# Patient Record
Sex: MALE | Race: BLACK OR AFRICAN AMERICAN | NOT HISPANIC OR LATINO | Employment: UNEMPLOYED | URBAN - METROPOLITAN AREA
[De-identification: names, ages, dates, MRNs, and addresses within clinical notes are randomized per-mention and may not be internally consistent; named-entity substitution may affect disease eponyms.]

---

## 2018-05-30 ENCOUNTER — HOSPITAL ENCOUNTER (EMERGENCY)
Facility: HOSPITAL | Age: 23
Discharge: HOME | End: 2018-05-30
Attending: EMERGENCY MEDICINE | Admitting: EMERGENCY MEDICINE
Payer: COMMERCIAL

## 2018-05-30 VITALS
OXYGEN SATURATION: 96 % | TEMPERATURE: 98.7 F | RESPIRATION RATE: 16 BRPM | DIASTOLIC BLOOD PRESSURE: 69 MMHG | SYSTOLIC BLOOD PRESSURE: 127 MMHG | WEIGHT: 150 LBS | HEART RATE: 63 BPM | BODY MASS INDEX: 22.22 KG/M2 | HEIGHT: 69 IN

## 2018-05-30 DIAGNOSIS — L02.91 ABSCESS: Primary | ICD-10-CM

## 2018-05-30 PROCEDURE — 0H9TXZZ DRAINAGE OF RIGHT BREAST, EXTERNAL APPROACH: ICD-10-PCS | Performed by: EMERGENCY MEDICINE

## 2018-05-30 PROCEDURE — 99282 EMERGENCY DEPT VISIT SF MDM: CPT

## 2018-05-30 PROCEDURE — 10061 I&D ABSCESS COMP/MULTIPLE: CPT | Performed by: PHYSICIAN ASSISTANT

## 2018-05-30 RX ORDER — SULFAMETHOXAZOLE AND TRIMETHOPRIM 800; 160 MG/1; MG/1
1 TABLET ORAL 2 TIMES DAILY
Qty: 14 TABLET | Refills: 0 | Status: SHIPPED | OUTPATIENT
Start: 2018-05-30 | End: 2018-06-06

## 2018-05-30 ASSESSMENT — ENCOUNTER SYMPTOMS
ANOREXIA: 0
VOMITING: 0
FATIGUE: 0
NAUSEA: 0
CHILLS: 0
FEVER: 0

## 2018-05-31 NOTE — ED ATTESTATION NOTE
The patient was evaluated and managed by the physician assistant.  I agree with the PA/NP’s history, physical, assessment, and plan of care, with the following exceptions: None         Bere Lowe MD  05/30/18 8571

## 2018-05-31 NOTE — ED PROVIDER NOTES
HPI     Chief Complaint   Patient presents with   • Abscess       21 yo AAM presents with abscess to his right axilla x 1 week. Reports pain and swelling worsening today. Denies drainage. Does report previous abscesses but they usually resolve on their own.           History provided by:  Patient   used: No    Abscess   Location:  Torso  Torso abscess location:  R axilla  Size:  4 cm  Abscess quality: fluctuance, induration, painful and redness    Abscess quality: not draining and not weeping    Red streaking: no    Duration:  7 days  Progression:  Worsening  Pain details:     Quality:  Pressure, throbbing and burning    Severity:  Moderate    Duration:  7 days    Timing:  Constant    Progression:  Worsening  Chronicity:  New  Context: not diabetes, not immunosuppression, not injected drug use and not skin injury    Relieved by:  Nothing  Worsened by:  Nothing  Ineffective treatments:  Warm water soaks  Associated symptoms: no anorexia, no fatigue, no fever, no nausea and no vomiting    Risk factors: prior abscess         Patient History     Past Medical History:   Diagnosis Date   • Abscess        History reviewed. No pertinent surgical history.    History reviewed. No pertinent family history.    Social History   Substance Use Topics   • Smoking status: Never Smoker   • Smokeless tobacco: Never Used   • Alcohol use No       Systems Reviewed from Nursing Triage:          Review of Systems     Review of Systems   Constitutional: Negative for chills, fatigue and fever.   Gastrointestinal: Negative for anorexia, nausea and vomiting.   Allergic/Immunologic: Negative for immunocompromised state.        Physical Exam     ED Triage Vitals [05/30/18 2124]   Temp Heart Rate Resp BP SpO2   37.1 °C (98.7 °F) 87 16 138/70 97 %      Temp Source Heart Rate Source Patient Position BP Location FiO2 (%) (Set)   Oral Monitor Sitting Right upper arm --                     Patient Vitals for the past 24 hrs:   BP  "Temp Temp src Pulse Resp SpO2 Height Weight   05/30/18 2308 127/69 - - 63 16 96 % - -   05/30/18 2124 138/70 37.1 °C (98.7 °F) Oral 87 16 97 % 1.753 m (5' 9\") 68 kg (150 lb)           Physical Exam   Constitutional: He is oriented to person, place, and time. He appears well-developed and well-nourished. No distress.   HENT:   Head: Normocephalic and atraumatic.   Eyes: EOM are normal.   Neck: Neck supple.   Cardiovascular: Normal rate, regular rhythm and intact distal pulses.    Pulmonary/Chest: Effort normal. No respiratory distress.   Musculoskeletal: Normal range of motion.   Neurological: He is alert and oriented to person, place, and time.   Skin: Skin is warm and dry. He is not diaphoretic.        Psychiatric: He has a normal mood and affect.   Nursing note and vitals reviewed.           Incision and Drainage  Date/Time: 5/30/2018 11:04 PM  Performed by: CECY DRAPER  Authorized by: WILMAR REINA     Consent:     Consent obtained:  Verbal    Consent given by:  Patient    Risks discussed:  Bleeding, incomplete drainage and infection    Alternatives discussed:  No treatment  Location:     Type:  Abscess    Size:  4 cm    Location: right axilla.  Pre-procedure details:     Skin preparation:  Chloraprep  Procedure type:     Complexity:  Complex  Procedure details:     Incision types:  Stab incision    Scalpel blade:  11    Wound management:  Probed and deloculated    Drainage:  Bloody and purulent    Drainage amount:  Moderate    Wound treatment:  Wound left open    Packing materials:  1/2 in gauze  Post-procedure details:     Patient tolerance of procedure:  Tolerated well, no immediate complications        ED Course & MDM     Labs Reviewed - No data to display    No orders to display           MDM  Number of Diagnoses or Management Options  Abscess:   Diagnosis management comments: Abscess drained, packing placed, will cover with Bactrim           ED Course          Clinical Impressions as of May 30 " 7149   Abscess     Disposition:  Discharge     PEYTON Oneil  05/30/18 2495

## 2018-05-31 NOTE — DISCHARGE INSTRUCTIONS
Your abscess was open and drained. Packing was placed. Remove it in 48 hours while in a warm shower. Change dressing daily.   Watch for worsening signs of infection.     Follow up with Dermatology for further evaluation.     Return to ED immediately if symptoms change or worsen.

## 2018-06-14 ENCOUNTER — HOSPITAL ENCOUNTER (EMERGENCY)
Facility: HOSPITAL | Age: 23
Discharge: HOME | End: 2018-06-15
Attending: EMERGENCY MEDICINE | Admitting: EMERGENCY MEDICINE
Payer: COMMERCIAL

## 2018-06-14 DIAGNOSIS — L02.91 ABSCESS: Primary | ICD-10-CM

## 2018-06-14 PROCEDURE — 0X943ZZ DRAINAGE OF RIGHT AXILLA, PERCUTANEOUS APPROACH: ICD-10-PCS | Performed by: EMERGENCY MEDICINE

## 2018-06-15 VITALS
SYSTOLIC BLOOD PRESSURE: 130 MMHG | DIASTOLIC BLOOD PRESSURE: 60 MMHG | TEMPERATURE: 97.6 F | RESPIRATION RATE: 16 BRPM | HEART RATE: 64 BPM | OXYGEN SATURATION: 100 %

## 2018-06-15 PROCEDURE — 63700000 HC SELF-ADMINISTRABLE DRUG: Performed by: PHYSICIAN ASSISTANT

## 2018-06-15 PROCEDURE — 10060 I&D ABSCESS SIMPLE/SINGLE: CPT | Performed by: PHYSICIAN ASSISTANT

## 2018-06-15 PROCEDURE — 99283 EMERGENCY DEPT VISIT LOW MDM: CPT | Mod: 25

## 2018-06-15 RX ORDER — SULFAMETHOXAZOLE AND TRIMETHOPRIM 800; 160 MG/1; MG/1
1 TABLET ORAL 2 TIMES DAILY
Qty: 14 TABLET | Refills: 0 | Status: SHIPPED | OUTPATIENT
Start: 2018-06-15 | End: 2018-06-22

## 2018-06-15 RX ORDER — SULFAMETHOXAZOLE AND TRIMETHOPRIM 800; 160 MG/1; MG/1
TABLET ORAL
Status: DISCONTINUED
Start: 2018-06-15 | End: 2018-06-15 | Stop reason: HOSPADM

## 2018-06-15 RX ORDER — SULFAMETHOXAZOLE AND TRIMETHOPRIM 800; 160 MG/1; MG/1
1 TABLET ORAL ONCE
Status: COMPLETED | OUTPATIENT
Start: 2018-06-15 | End: 2018-06-15

## 2018-06-15 RX ADMIN — SULFAMETHOXAZOLE AND TRIMETHOPRIM 1 TABLET: 800; 160 TABLET ORAL at 01:06

## 2018-06-15 ASSESSMENT — ENCOUNTER SYMPTOMS
FATIGUE: 0
COLOR CHANGE: 1
WEAKNESS: 0
SHORTNESS OF BREATH: 0
CHEST TIGHTNESS: 0
ACTIVITY CHANGE: 0
FEVER: 0
WOUND: 0
NECK PAIN: 0
CHILLS: 0
VOMITING: 0
NAUSEA: 0
NUMBNESS: 0
NECK STIFFNESS: 0
BACK PAIN: 0
DIFFICULTY URINATING: 0

## 2018-06-15 NOTE — ED PROVIDER NOTES
HPI     Chief Complaint   Patient presents with   • Abscess         History provided by:  Patient   used: No    Abscess   Location:  Shoulder/arm  Shoulder/arm abscess location:  R axilla  Abscess quality: induration, painful and redness    Red streaking: no    Duration:  2 days  Progression:  Worsening  Pain details:     Quality:  Aching and pressure    Severity:  Mild    Timing:  Constant    Progression:  Worsening  Chronicity:  Chronic (Pt was here 2 weeks ago for drainage of abscess, put on antibiotics but pt states he did not take them. Pt did see Dermatology since last ER visit)  Relieved by: warm compress.  Worsened by:  Nothing  Associated symptoms: no fatigue, no fever, no nausea and no vomiting    Risk factors: prior abscess         Patient History     Past Medical History:   Diagnosis Date   • Abscess    • Axillary hyperhidrosis        History reviewed. No pertinent surgical history.    History reviewed. No pertinent family history.    Social History   Substance Use Topics   • Smoking status: Never Smoker   • Smokeless tobacco: Never Used   • Alcohol use No       Systems Reviewed from Nursing Triage:          Review of Systems     Review of Systems   Constitutional: Negative for activity change, chills, fatigue and fever.   Respiratory: Negative for chest tightness and shortness of breath.    Cardiovascular: Negative for chest pain.   Gastrointestinal: Negative for nausea and vomiting.   Genitourinary: Negative for difficulty urinating.   Musculoskeletal: Negative for back pain, neck pain and neck stiffness.   Skin: Positive for color change (redness, swelling surrounding abscess). Negative for wound.   Neurological: Negative for weakness and numbness.        Physical Exam     ED Triage Vitals [06/15/18 0012]   Temp Heart Rate Resp BP SpO2   36.4 °C (97.6 °F) 64 16 130/60 100 %      Temp Source Heart Rate Source Patient Position BP Location FiO2 (%) (Set)   Oral Monitor Sitting Left  upper arm --                     Patient Vitals for the past 24 hrs:   BP Temp Temp src Pulse Resp SpO2   06/15/18 0012 130/60 36.4 °C (97.6 °F) Oral 64 16 100 %           Physical Exam   Constitutional: He is oriented to person, place, and time. Vital signs are normal. He appears well-developed and well-nourished. No distress.   HENT:   Head: Normocephalic.   Neck: Normal range of motion.   Cardiovascular: Normal rate, regular rhythm and normal heart sounds.    Pulmonary/Chest: Effort normal and breath sounds normal.   Neurological: He is alert and oriented to person, place, and time. He has normal strength. He is not disoriented. No cranial nerve deficit or sensory deficit.   Skin: Skin is warm.   +2.5 cm by 2 cm right axilla abscess, +induration, tender to palpation. No noted drainage, no streaking, no adenopathy.    Psychiatric: He has a normal mood and affect. His speech is normal and behavior is normal.            Incision and Drainage  Date/Time: 6/15/2018 1:41 AM  Performed by: DAVIS ROLON  Authorized by: AMARILYS BARRAGAN     Consent:     Consent obtained:  Verbal    Consent given by:  Patient    Risks discussed:  Bleeding, incomplete drainage, pain and infection    Alternatives discussed:  No treatment  Location:     Type:  Abscess    Size:  2.5 cm by 2 cm    Location:  Upper extremity    Upper extremity location: right axilla.  Pre-procedure details:     Skin preparation:  Chloraprep  Anesthesia (see MAR for exact dosages):     Anesthesia method:  Local infiltration    Local anesthetic:  Lidocaine 1% w/o epi  Procedure type:     Complexity:  Simple  Procedure details:     Incision types:  Single straight    Scalpel blade:  11    Wound management:  Probed and deloculated and irrigated with saline    Drainage:  Purulent    Drainage amount:  Moderate    Wound treatment:  Wound left open    Packing materials:  1/2 in gauze  Post-procedure details:     Patient tolerance of procedure:  Tolerated well, no  immediate complications        ED Course & MDM     Labs Reviewed - No data to display    No orders to display           MDM         ED Course          Clinical Impressions as of Melquiades 15 0142   Abscess     Disposition:  Discharge     PEYTON Damian  06/15/18 0142

## 2018-06-15 NOTE — ED ATTESTATION NOTE
The patient was evaluated and managed by the physician assistant.    Case discussed with PA, I supervised care and agree with plan.     Endy Davis MD  06/15/18 0213

## 2018-07-15 ENCOUNTER — HOSPITAL ENCOUNTER (EMERGENCY)
Facility: HOSPITAL | Age: 23
Discharge: HOME | End: 2018-07-15
Attending: EMERGENCY MEDICINE
Payer: COMMERCIAL

## 2018-07-15 VITALS
HEIGHT: 70 IN | OXYGEN SATURATION: 100 % | RESPIRATION RATE: 16 BRPM | TEMPERATURE: 98.5 F | DIASTOLIC BLOOD PRESSURE: 72 MMHG | WEIGHT: 150 LBS | HEART RATE: 61 BPM | BODY MASS INDEX: 21.47 KG/M2 | SYSTOLIC BLOOD PRESSURE: 128 MMHG

## 2018-07-15 DIAGNOSIS — K12.1 MOUTH ULCER: Primary | ICD-10-CM

## 2018-07-15 PROCEDURE — 99281 EMR DPT VST MAYX REQ PHY/QHP: CPT

## 2018-07-15 RX ORDER — AMOXICILLIN 500 MG/1
CAPSULE ORAL
Refills: 0 | COMMUNITY
Start: 2018-07-02 | End: 2018-09-29 | Stop reason: ALTCHOICE

## 2018-07-15 ASSESSMENT — ENCOUNTER SYMPTOMS
SHORTNESS OF BREATH: 0
ACTIVITY CHANGE: 0
ABDOMINAL PAIN: 0
LIGHT-HEADEDNESS: 0
WEAKNESS: 0
CHEST TIGHTNESS: 0
DIFFICULTY URINATING: 0
CHILLS: 0
FEVER: 0
NECK PAIN: 0
ADENOPATHY: 0
RHINORRHEA: 0
BRUISES/BLEEDS EASILY: 0
SORE THROAT: 0
NECK STIFFNESS: 0
BACK PAIN: 0
FACIAL SWELLING: 0
TROUBLE SWALLOWING: 0
DIZZINESS: 0
SWOLLEN GLANDS: 0

## 2018-07-15 NOTE — ED ATTESTATION NOTE
I saw this patient in concert with the physician assistant, I have evaluated the patient with the physician assistant and I attest to the history, and physical exam, review of systems, medical decision-making, And final disposition of this patient with the physician assistant.     Cesar Cote, DO  07/15/18 0607

## 2018-07-15 NOTE — ED PROVIDER NOTES
HPI     Chief Complaint   Patient presents with   • Mouth Lesions         History provided by:  Patient   used: No    Mouth Lesions   Location:  Lower lip  Lower lip location:  R inner  Quality:  Blistered  Onset quality:  Gradual (Pt states he developed a blister to right lower lip over the past few weeks, today the area burst and started to drian a mixture of fluid and blood. Pt is here to make sure it will not get infected. Currently on Amox for an abscess)  Severity:  Mild  Duration:  3 weeks  Progression since onset: Burst PTA.  Context: not a possible infection and not trauma    Worsened by:  Nothing  Associated symptoms: no congestion, no dental pain, no fever, no neck pain, no rash, no rhinorrhea, no sore throat and no swollen glands         Patient History     Past Medical History:   Diagnosis Date   • Abscess    • Axillary hyperhidrosis        History reviewed. No pertinent surgical history.    History reviewed. No pertinent family history.    Social History   Substance Use Topics   • Smoking status: Never Smoker   • Smokeless tobacco: Never Used   • Alcohol use No       Systems Reviewed from Nursing Triage:          Review of Systems     Review of Systems   Constitutional: Negative for activity change, chills and fever.   HENT: Positive for mouth sores. Negative for congestion, drooling, facial swelling, rhinorrhea, sore throat and trouble swallowing.    Respiratory: Negative for chest tightness and shortness of breath.    Cardiovascular: Negative for chest pain.   Gastrointestinal: Negative for abdominal pain.   Genitourinary: Negative for difficulty urinating.   Musculoskeletal: Negative for back pain, gait problem, neck pain and neck stiffness.   Skin: Negative for rash.   Neurological: Negative for dizziness, weakness and light-headedness.   Hematological: Negative for adenopathy. Does not bruise/bleed easily.        Physical Exam     ED Triage Vitals [07/15/18 0254]   Temp Heart  "Rate Resp BP SpO2   36.9 °C (98.5 °F) (!) 57 14 124/80 100 %      Temp Source Heart Rate Source Patient Position BP Location FiO2 (%) (Set)   Oral Monitor -- -- --                     Patient Vitals for the past 24 hrs:   BP Temp Temp src Pulse Resp SpO2 Height Weight   07/15/18 0254 124/80 36.9 °C (98.5 °F) Oral (!) 57 14 100 % 1.778 m (5' 10\") 68 kg (150 lb)           Physical Exam   Constitutional: He is oriented to person, place, and time. Vital signs are normal. He appears well-developed and well-nourished. No distress.   HENT:   Head: Normocephalic and atraumatic.   Mouth/Throat: Uvula is midline, oropharynx is clear and moist and mucous membranes are normal. Tonsils are 0 on the right. Tonsils are 0 on the left. No tonsillar exudate.   +abrasion/ulcer to right inner lip, no drainage noted, no erythema. Non tender to palpation   Neck: Normal range of motion. Neck supple.   Cardiovascular: Normal rate, regular rhythm and normal heart sounds.    Pulmonary/Chest: Effort normal and breath sounds normal.   Lymphadenopathy:     He has no cervical adenopathy.   Neurological: He is alert and oriented to person, place, and time. He has normal strength. He is not disoriented. No cranial nerve deficit or sensory deficit.   Skin: Skin is warm. Capillary refill takes less than 2 seconds.   Psychiatric: He has a normal mood and affect.            Procedures    ED Course & MDM     Labs Reviewed - No data to display    No orders to display           MDM         ED Course          Clinical Impressions as of Jul 15 0338   Mouth ulcer        PEYTON Damian  07/15/18 0338    "

## 2018-09-29 ENCOUNTER — HOSPITAL ENCOUNTER (EMERGENCY)
Facility: HOSPITAL | Age: 23
Discharge: HOME | End: 2018-09-29
Attending: EMERGENCY MEDICINE
Payer: COMMERCIAL

## 2018-09-29 VITALS
SYSTOLIC BLOOD PRESSURE: 142 MMHG | WEIGHT: 150 LBS | DIASTOLIC BLOOD PRESSURE: 80 MMHG | TEMPERATURE: 97.8 F | HEART RATE: 66 BPM | BODY MASS INDEX: 21.47 KG/M2 | HEIGHT: 70 IN | OXYGEN SATURATION: 100 % | RESPIRATION RATE: 14 BRPM

## 2018-09-29 DIAGNOSIS — A60.02 HERPES GENITALIS IN MEN: ICD-10-CM

## 2018-09-29 DIAGNOSIS — R21 RASH: Primary | ICD-10-CM

## 2018-09-29 LAB
BILIRUB UR QL STRIP.AUTO: NEGATIVE MG/DL
CLARITY UR REFRACT.AUTO: CLEAR
COLOR UR AUTO: YELLOW
GLUCOSE UR STRIP.AUTO-MCNC: NEGATIVE MG/DL
HGB UR QL STRIP.AUTO: NEGATIVE
HSV1 DNA SPEC QL NAA+PROBE: NOT DETECTED
HSV2 DNA SPEC QL NAA+PROBE: NOT DETECTED
KETONES UR STRIP.AUTO-MCNC: NEGATIVE MG/DL
LEUKOCYTE ESTERASE UR QL STRIP.AUTO: NEGATIVE
NITRITE UR QL STRIP.AUTO: NEGATIVE
PH UR STRIP.AUTO: 7.5 [PH]
PROT UR QL STRIP.AUTO: NEGATIVE
SP GR UR REFRACT.AUTO: 1.01
UROBILINOGEN UR STRIP-ACNC: 1 EU/DL

## 2018-09-29 PROCEDURE — 87150 DNA/RNA AMPLIFIED PROBE: CPT | Performed by: PHYSICIAN ASSISTANT

## 2018-09-29 PROCEDURE — 99283 EMERGENCY DEPT VISIT LOW MDM: CPT

## 2018-09-29 PROCEDURE — 81003 URINALYSIS AUTO W/O SCOPE: CPT | Performed by: PHYSICIAN ASSISTANT

## 2018-09-29 PROCEDURE — 87491 CHLMYD TRACH DNA AMP PROBE: CPT | Mod: 59 | Performed by: PHYSICIAN ASSISTANT

## 2018-09-29 RX ORDER — CLOTRIMAZOLE 1 G/ML
SOLUTION TOPICAL 2 TIMES DAILY
COMMUNITY
End: 2020-01-10

## 2018-09-29 RX ORDER — VALACYCLOVIR HYDROCHLORIDE 1 G/1
1000 TABLET, FILM COATED ORAL 2 TIMES DAILY
Qty: 14 TABLET | Refills: 0 | Status: SHIPPED | OUTPATIENT
Start: 2018-09-29 | End: 2018-10-06

## 2018-10-01 LAB
C TRACH DNA SPEC QL NAA+PROBE: NOT DETECTED
N GONORRHOEA DNA SPEC QL NAA+PROBE: NOT DETECTED

## 2018-10-01 ASSESSMENT — ENCOUNTER SYMPTOMS
ABDOMINAL PAIN: 0
HEMATURIA: 0
FEVER: 0
CHILLS: 0
ACTIVITY CHANGE: 0
DIFFICULTY URINATING: 0

## 2018-10-01 NOTE — ED PROVIDER NOTES
HPI     Chief Complaint   Patient presents with   • Rash     Patient with rash to groin for 2 weeks.  States was seen in clinic 4 aj ago and given fungal cream for jock itch.  States since starting cream he states that the rash is worse         History provided by:  Patient   used: No    Rash   Location:  Pelvis  Pelvic rash location:  Penis  Quality: blistering, itchiness and redness    Severity:  Mild  Onset quality:  Gradual  Duration:  2 weeks  Timing:  Constant  Chronicity:  New  Context comment:  Pt started with a rash to the under side of his penis for the past 2 weeks, saw clinic 4 days ago and was started on fungal cream but patient states that is causing the rash to become itchy and worsening rash.   Relieved by:  Nothing  Exacerbated by: fungal cream.  Associated symptoms: no abdominal pain and no fever    Associated symptoms comment:  Pt is sexually active and received oral sex from his partner, rash started after       Patient History     Past Medical History:   Diagnosis Date   • Abscess    • Axillary hyperhidrosis        History reviewed. No pertinent surgical history.    History reviewed. No pertinent family history.    Social History   Substance Use Topics   • Smoking status: Never Smoker   • Smokeless tobacco: Never Used   • Alcohol use Yes       Systems Reviewed from Nursing Triage:          Review of Systems     Review of Systems   Constitutional: Negative for activity change, chills and fever.   Gastrointestinal: Negative for abdominal pain.   Genitourinary: Negative for difficulty urinating, discharge, hematuria, penile pain, penile swelling, scrotal swelling and testicular pain.   Skin: Positive for rash.        Physical Exam     ED Triage Vitals   Temp Heart Rate Resp BP SpO2   09/29/18 0922 09/29/18 0922 09/29/18 0922 09/29/18 0922 09/29/18 0922   36.8 °C (98.3 °F) 61 14 133/68 98 %      Temp Source Heart Rate Source Patient Position BP Location FiO2 (%) (Set)   09/29/18  0922 09/29/18 0922 09/29/18 1118 09/29/18 1118 --   Oral Monitor Sitting Right upper arm                      No data found.          Physical Exam   Constitutional: He is oriented to person, place, and time. Vital signs are normal. He appears well-developed and well-nourished. No distress.   HENT:   Head: Normocephalic.   Neck: Normal range of motion.   Cardiovascular: Normal rate and regular rhythm.    Pulmonary/Chest: Effort normal and breath sounds normal.   Abdominal: Soft. Normal appearance and bowel sounds are normal. There is no tenderness.   Genitourinary: Testes normal.   Genitourinary Comments: +vesicular rash to penile shaft  No erythema, no rash to groin or skin folds   Lymphadenopathy: No inguinal adenopathy noted on the right or left side.   Neurological: He is alert and oriented to person, place, and time. He is not disoriented.   Skin: Skin is warm. Capillary refill takes less than 2 seconds. Rash noted. Rash is vesicular.   Psychiatric: He has a normal mood and affect.            Procedures    ED Course & MDM     Labs Reviewed   HSV 1/2 BY DNA AMPLIFICATION - Normal       Result Value    HSV 1 DNA Not Detected      HSV 2 DNA Not Detected     UA REFLEX CULTURE (MACROSCOPIC) - Normal    Color, Urine Yellow      Clarity, Urine Clear      Specific Gravity, Urine 1.010      pH, Urine 7.5      Leukocyte Esterase Negative      Nitrite, Urine Negative      Protein, Urine Negative      Glucose, Urine Negative      Ketones, Urine Negative      Urobilinogen, Urine 1.0      Bilirubin, Urine Negative      Blood, Urine Negative     CHLAMYDIA/GC DNA,PCR:URINE,SWAB   URINALYSIS REFLEX CULTURE    Narrative:     The following orders were created for panel order Urinalysis with Reflex Culture.  Procedure                               Abnormality         Status                     ---------                               -----------         ------                     UA Reflex to Culture (Mac...[[id number]]  Normal               Final result                 Please view results for these tests on the individual orders.       No orders to display           MDM         Clinical Impressions as of Oct 01 1348   Rash   Herpes genitalis in men        Wilma Perez PA C  10/01/18 9769

## 2018-10-03 NOTE — ED ATTESTATION NOTE
The patient was evaluated and managed by the physician assistant / nurse practitioner.       Fito Jain MD  10/03/18 0136

## 2019-01-17 ENCOUNTER — HOSPITAL ENCOUNTER (EMERGENCY)
Facility: HOSPITAL | Age: 24
Discharge: HOME | End: 2019-01-17
Attending: EMERGENCY MEDICINE
Payer: COMMERCIAL

## 2019-01-17 ENCOUNTER — APPOINTMENT (EMERGENCY)
Dept: RADIOLOGY | Facility: HOSPITAL | Age: 24
End: 2019-01-17
Attending: EMERGENCY MEDICINE
Payer: COMMERCIAL

## 2019-01-17 VITALS
HEART RATE: 72 BPM | RESPIRATION RATE: 17 BRPM | DIASTOLIC BLOOD PRESSURE: 64 MMHG | TEMPERATURE: 98.1 F | HEIGHT: 70 IN | BODY MASS INDEX: 21.47 KG/M2 | SYSTOLIC BLOOD PRESSURE: 115 MMHG | WEIGHT: 150 LBS | OXYGEN SATURATION: 98 %

## 2019-01-17 DIAGNOSIS — M94.0 COSTOCHONDRITIS: Primary | ICD-10-CM

## 2019-01-17 PROCEDURE — 63700000 HC SELF-ADMINISTRABLE DRUG: Performed by: EMERGENCY MEDICINE

## 2019-01-17 PROCEDURE — 99283 EMERGENCY DEPT VISIT LOW MDM: CPT | Mod: 25

## 2019-01-17 PROCEDURE — 93005 ELECTROCARDIOGRAM TRACING: CPT | Performed by: EMERGENCY MEDICINE

## 2019-01-17 PROCEDURE — 71046 X-RAY EXAM CHEST 2 VIEWS: CPT

## 2019-01-17 RX ORDER — IBUPROFEN 600 MG/1
600 TABLET ORAL ONCE
Status: COMPLETED | OUTPATIENT
Start: 2019-01-17 | End: 2019-01-17

## 2019-01-17 RX ORDER — ONABOTULINUMTOXINA 100 [USP'U]/1
INJECTION, POWDER, LYOPHILIZED, FOR SOLUTION INTRADERMAL; INTRAMUSCULAR
COMMUNITY
Start: 2018-11-29 | End: 2020-01-10

## 2019-01-17 RX ORDER — IBUPROFEN 600 MG/1
600 TABLET ORAL EVERY 6 HOURS PRN
Qty: 30 TABLET | Refills: 0 | Status: SHIPPED | OUTPATIENT
Start: 2019-01-17 | End: 2020-01-10

## 2019-01-17 RX ADMIN — IBUPROFEN 600 MG: 600 TABLET ORAL at 11:29

## 2019-01-17 ASSESSMENT — ENCOUNTER SYMPTOMS
FATIGUE: 0
PALPITATIONS: 0
SORE THROAT: 0
VOMITING: 0
BACK PAIN: 0
FLANK PAIN: 0
COUGH: 0
DYSURIA: 0
ARTHRALGIAS: 0
ABDOMINAL PAIN: 0
COLOR CHANGE: 0
TROUBLE SWALLOWING: 0
SHORTNESS OF BREATH: 0
HEADACHES: 0
NAUSEA: 0
FEVER: 0
LOWER EXTREMITY EDEMA: 0
NERVOUS/ANXIOUS: 1

## 2019-01-17 NOTE — DISCHARGE INSTRUCTIONS
Please review all discharge instructions as discussed.  Please be re-evaluated by your primary doctor as soon as possible and be sure to review all laboratory, radiology and other testing with your doctor.  Should your symptoms worsen or not improve, return to emergency room immediately.

## 2019-01-17 NOTE — ED PROVIDER NOTES
HPI     Chief Complaint   Patient presents with   • Chest Pain     Patient c/o generalized chest pain that comes and goes for 1.5 weeks.  States it is stabbing pain and when he lays down he feels sob.  Denies being sick.  No airplane or long car rides   • Shortness of Breath         History provided by:  Patient   used: No    Chest Pain   Pain location:  L chest (sternal)  Pain quality: sharp    Pain radiates to:  Does not radiate  Pain severity:  Moderate  Onset quality:  Unable to specify (1.5 weeks ago)  Duration:  1 day  Timing:  Intermittent  Chronicity:  New  Context: not trauma    Context comment:  +increased stress levels.  Worsened by:  Certain positions (lying flat)  Associated symptoms: anxiety    Associated symptoms: no abdominal pain, no back pain, no cough, no dysphagia, no fatigue, no fever, no headache, no lower extremity edema, no nausea, no palpitations, no shortness of breath and no vomiting         Patient History     Past Medical History:   Diagnosis Date   • Abscess    • Axillary hyperhidrosis        History reviewed. No pertinent surgical history.    History reviewed. No pertinent family history.    Social History   Substance Use Topics   • Smoking status: Never Smoker   • Smokeless tobacco: Never Used   • Alcohol use Yes      Comment: socially       Systems Reviewed from Nursing Triage:          Review of Systems     Review of Systems   Constitutional: Negative for fatigue and fever.   HENT: Negative for sore throat and trouble swallowing.    Respiratory: Negative for cough and shortness of breath.    Cardiovascular: Positive for chest pain. Negative for palpitations.   Gastrointestinal: Negative for abdominal pain, nausea and vomiting.   Genitourinary: Negative for dysuria and flank pain.   Musculoskeletal: Negative for arthralgias and back pain.   Skin: Negative for color change and rash.   Neurological: Negative for syncope and headaches.   Psychiatric/Behavioral: The  "patient is nervous/anxious.    All other systems reviewed and are negative.       Physical Exam     ED Triage Vitals [01/17/19 1054]   Temp Heart Rate Resp BP SpO2   36.6 °C (97.8 °F) 71 18 130/64 97 %      Temp Source Heart Rate Source Patient Position BP Location FiO2 (%) (Set)   Oral Monitor Sitting Right upper arm --       Pulse Ox %: 98 % (01/17/19 1106)  Pulse Ox Interpretation: Normal (01/17/19 1106)  Heart Rate: 66 (01/17/19 1106)       Patient Vitals for the past 24 hrs:   BP Temp Temp src Pulse Resp SpO2 Height Weight   01/17/19 1105 123/66 - - 76 18 97 % - -   01/17/19 1054 130/64 36.6 °C (97.8 °F) Oral 71 18 97 % 1.778 m (5' 10\") 68 kg (150 lb)           Physical Exam   Constitutional: He appears well-developed and well-nourished.   HENT:   Head: Normocephalic and atraumatic.   Eyes: Conjunctivae are normal. No scleral icterus.   Neck: Neck supple. No JVD present.   Cardiovascular: Normal rate and regular rhythm.    Pulmonary/Chest: Effort normal and breath sounds normal. No respiratory distress. He exhibits tenderness.   L parasternal tenderness.   Abdominal: Soft. There is no tenderness.   Musculoskeletal: He exhibits no edema or tenderness.   Neurological: He is alert. He has normal strength.   Skin: Skin is warm and dry.   Psychiatric: He has a normal mood and affect. His behavior is normal.   Nursing note and vitals reviewed.           Procedures    ED Course & MDM     Labs Reviewed - No data to display    X-RAY CHEST 2 VIEWS   Final Result   IMPRESSION:  No active disease      ECG 12 lead    (Results Pending)               MDM         ED Course as of Jan 17 1200   Thu Jan 17, 2019   1147 23-year-old male no significant PMH complains of constant midsternal CP for 1 week.  Denies injury.  Not coughing. No leg swelling/VTE history. No fever or recent URI.  Reproducible chest wall tenderness  EKG-no acute changes  PERC neg  HEART score 0  CXR clear  Story c/w MSK pain. Will Rx Ibuprofen.  [NADIA]      ED " Course User Index  [NADIA] Jae Agudelo MD         Clinical Impressions as of Jan 17 1200   Costochondritis        Scribe Attestation  By signing my name below, I, Cali Hartley, attest that this documentation has been prepared under the direction and in the presence of Dr. Agudelo.    1/17/2019 11:17 AM    I, Jae Agudelo, personally performed the services described in this documentation, as documented by the scribe in my presence, and it is both accurate and complete.  1/17/2019 12:00 PM              Cali Hartley  01/17/19 1121       Jae Agudelo MD  01/17/19 1200

## 2019-01-18 LAB
ATRIAL RATE: 72
P AXIS: 74
PR INTERVAL: 148
QRS DURATION: 100
QT INTERVAL: 374
QTC CALCULATION(BAZETT): 409
R AXIS: 91
T WAVE AXIS: 70
VENTRICULAR RATE: 72

## 2019-01-18 PROCEDURE — 93010 ELECTROCARDIOGRAM REPORT: CPT | Performed by: INTERNAL MEDICINE

## 2019-02-28 ENCOUNTER — HOSPITAL ENCOUNTER (EMERGENCY)
Facility: HOSPITAL | Age: 24
Discharge: HOME | End: 2019-02-28
Attending: EMERGENCY MEDICINE
Payer: COMMERCIAL

## 2019-02-28 VITALS
TEMPERATURE: 98 F | WEIGHT: 150 LBS | RESPIRATION RATE: 16 BRPM | HEART RATE: 70 BPM | SYSTOLIC BLOOD PRESSURE: 132 MMHG | BODY MASS INDEX: 22.22 KG/M2 | DIASTOLIC BLOOD PRESSURE: 70 MMHG | HEIGHT: 69 IN | OXYGEN SATURATION: 99 %

## 2019-02-28 DIAGNOSIS — L02.91 ABSCESS: Primary | ICD-10-CM

## 2019-02-28 PROCEDURE — 10160 PNXR ASPIR ABSC HMTMA BULLA: CPT | Performed by: PHYSICIAN ASSISTANT

## 2019-02-28 PROCEDURE — 0H9BXZZ DRAINAGE OF RIGHT UPPER ARM SKIN, EXTERNAL APPROACH: ICD-10-PCS | Performed by: EMERGENCY MEDICINE

## 2019-02-28 PROCEDURE — 99282 EMERGENCY DEPT VISIT SF MDM: CPT | Mod: 25

## 2019-02-28 RX ORDER — SULFAMETHOXAZOLE AND TRIMETHOPRIM 800; 160 MG/1; MG/1
1 TABLET ORAL 2 TIMES DAILY
Qty: 14 TABLET | Refills: 0 | Status: SHIPPED | OUTPATIENT
Start: 2019-02-28 | End: 2019-02-28

## 2019-02-28 RX ORDER — SULFAMETHOXAZOLE AND TRIMETHOPRIM 800; 160 MG/1; MG/1
1 TABLET ORAL 2 TIMES DAILY
Qty: 14 TABLET | Refills: 0 | Status: SHIPPED | OUTPATIENT
Start: 2019-02-28 | End: 2019-03-07

## 2019-02-28 ASSESSMENT — ENCOUNTER SYMPTOMS
MUSCULOSKELETAL NEGATIVE: 1
CHILLS: 0
FEVER: 0
NUMBNESS: 0

## 2019-02-28 NOTE — DISCHARGE INSTRUCTIONS
Follow-up with PCP or return in 2 days for packing removal.  Please return sooner for new or worsening concerns

## 2019-02-28 NOTE — ED PROVIDER NOTES
"HPI     Chief Complaint   Patient presents with   • Abscess     Patient with abscess to right underarm for 1 week       24 y/o AAM who presents to er for evaluation of cyst to R arm x 1 week. Pt denies fevers, chills, warmth. He reports prior abscess to same location summer 2018. Pt denies drainage from skin             Patient History     Past Medical History:   Diagnosis Date   • Abscess    • Axillary hyperhidrosis        History reviewed. No pertinent surgical history.    History reviewed. No pertinent family history.    Social History   Substance Use Topics   • Smoking status: Never Smoker   • Smokeless tobacco: Never Used   • Alcohol use Yes      Comment: socially       Systems Reviewed from Nursing Triage:          Review of Systems     Review of Systems   Constitutional: Negative for chills and fever.   Musculoskeletal: Negative.    Skin:        Axilla abscess   Neurological: Negative for numbness.        Physical Exam     ED Triage Vitals [02/28/19 0849]   Temp Heart Rate Resp BP SpO2   36.8 °C (98.3 °F) 66 18 (!) 141/61 96 %      Temp Source Heart Rate Source Patient Position BP Location FiO2 (%) (Set)   Oral Monitor Sitting Right upper arm --                     Patient Vitals for the past 24 hrs:   BP Temp Temp src Pulse Resp SpO2 Height Weight   02/28/19 1123 132/70 36.7 °C (98 °F) Oral 70 16 99 % - -   02/28/19 1015 (!) 141/72 37 °C (98.6 °F) Oral 76 18 - - -   02/28/19 0849 (!) 141/61 36.8 °C (98.3 °F) Oral 66 18 96 % 1.753 m (5' 9\") 68 kg (150 lb)           Physical Exam   Constitutional: He is oriented to person, place, and time. He appears well-developed and well-nourished.   Pulmonary/Chest: No respiratory distress.   Musculoskeletal: Normal range of motion.   Neurological: He is alert and oriented to person, place, and time.   Skin:   4 cm area of swelling, redness, tenderness to R axilla   Nursing note and vitals reviewed.           Incision and Drainage  Date/Time: 2/28/2019 10:55 AM  Performed " by: HELENE FORTE  Authorized by: DINESH STOREY     Consent:     Consent obtained:  Verbal    Consent given by:  Patient  Location:     Type:  Abscess    Size:  4    Location:  Upper extremity    Upper extremity location:  Arm    Arm location:  R upper arm  Pre-procedure details:     Skin preparation:  Betadine  Anesthesia (see MAR for exact dosages):     Anesthesia method:  Local infiltration    Local anesthetic:  Lidocaine 1% WITH epi  Procedure type:     Complexity:  Simple  Procedure details:     Incision types:  Single straight    Scalpel blade:  11    Wound management:  Probed and deloculated and irrigated with saline    Drainage:  Purulent    Drainage amount:  Moderate    Packing materials:  1/4 in gauze  Post-procedure details:     Patient tolerance of procedure:  Tolerated well, no immediate complications        ED Course & MDM     Labs Reviewed - No data to display    No orders to display               MDM  Number of Diagnoses or Management Options  Diagnosis management comments: Bedside I/D  Staffed with Dr. Storey           Clinical Impressions as of Feb 28 1140   Abscess        Helene Forte PA C  02/28/19 1141

## 2019-03-15 ENCOUNTER — HOSPITAL ENCOUNTER (EMERGENCY)
Facility: HOSPITAL | Age: 24
Discharge: HOME | End: 2019-03-15
Attending: EMERGENCY MEDICINE
Payer: COMMERCIAL

## 2019-03-15 ENCOUNTER — APPOINTMENT (EMERGENCY)
Dept: RADIOLOGY | Facility: HOSPITAL | Age: 24
End: 2019-03-15
Payer: COMMERCIAL

## 2019-03-15 VITALS
RESPIRATION RATE: 15 BRPM | WEIGHT: 150 LBS | DIASTOLIC BLOOD PRESSURE: 61 MMHG | HEIGHT: 69 IN | HEART RATE: 74 BPM | BODY MASS INDEX: 22.22 KG/M2 | SYSTOLIC BLOOD PRESSURE: 135 MMHG | OXYGEN SATURATION: 99 % | TEMPERATURE: 98.1 F

## 2019-03-15 DIAGNOSIS — S60.512A ABRASION OF LEFT HAND, INITIAL ENCOUNTER: ICD-10-CM

## 2019-03-15 DIAGNOSIS — S62.617A CLOSED DISPLACED FRACTURE OF PROXIMAL PHALANX OF LEFT LITTLE FINGER, INITIAL ENCOUNTER: Primary | ICD-10-CM

## 2019-03-15 PROCEDURE — 90715 TDAP VACCINE 7 YRS/> IM: CPT | Performed by: PHYSICIAN ASSISTANT

## 2019-03-15 PROCEDURE — 2W3KX1Z IMMOBILIZATION OF LEFT FINGER USING SPLINT: ICD-10-PCS | Performed by: EMERGENCY MEDICINE

## 2019-03-15 PROCEDURE — 90471 IMMUNIZATION ADMIN: CPT | Performed by: PHYSICIAN ASSISTANT

## 2019-03-15 PROCEDURE — 99283 EMERGENCY DEPT VISIT LOW MDM: CPT | Mod: 25

## 2019-03-15 PROCEDURE — 63600000 HC DRUGS/DETAIL CODE: Performed by: PHYSICIAN ASSISTANT

## 2019-03-15 PROCEDURE — 3E0234Z INTRODUCTION OF SERUM, TOXOID AND VACCINE INTO MUSCLE, PERCUTANEOUS APPROACH: ICD-10-PCS | Performed by: EMERGENCY MEDICINE

## 2019-03-15 PROCEDURE — 29130 APPL FINGER SPLINT STATIC: CPT

## 2019-03-15 PROCEDURE — 73140 X-RAY EXAM OF FINGER(S): CPT | Mod: LT

## 2019-03-15 RX ORDER — IBUPROFEN 600 MG/1
600 TABLET ORAL EVERY 6 HOURS PRN
Qty: 25 TABLET | Refills: 0 | Status: SHIPPED | OUTPATIENT
Start: 2019-03-15 | End: 2020-01-10

## 2019-03-15 RX ADMIN — TETANUS TOXOID, REDUCED DIPHTHERIA TOXOID AND ACELLULAR PERTUSSIS VACCINE, ADSORBED 0.5 ML: 5; 2.5; 8; 8; 2.5 SUSPENSION INTRAMUSCULAR at 09:55

## 2019-03-15 ASSESSMENT — ENCOUNTER SYMPTOMS
NUMBNESS: 0
WEAKNESS: 0
SORE THROAT: 0
COUGH: 0
DIARRHEA: 0
VOMITING: 0
FEVER: 0
SHORTNESS OF BREATH: 0
ABDOMINAL PAIN: 0
COLOR CHANGE: 0

## 2019-03-15 NOTE — ED ATTESTATION NOTE
The patient was evaluated and managed by the physician assistant / nurse practitioner.       Luis Aguirre MD  03/15/19 1126
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
## 2019-09-26 ENCOUNTER — HOSPITAL ENCOUNTER (EMERGENCY)
Facility: HOSPITAL | Age: 24
Discharge: HOME | End: 2019-09-26
Attending: EMERGENCY MEDICINE
Payer: COMMERCIAL

## 2019-09-26 VITALS
BODY MASS INDEX: 22.4 KG/M2 | TEMPERATURE: 98.9 F | DIASTOLIC BLOOD PRESSURE: 58 MMHG | RESPIRATION RATE: 20 BRPM | WEIGHT: 160 LBS | HEART RATE: 70 BPM | SYSTOLIC BLOOD PRESSURE: 109 MMHG | OXYGEN SATURATION: 98 % | HEIGHT: 71 IN

## 2019-09-26 DIAGNOSIS — L02.91 ABSCESS: Primary | ICD-10-CM

## 2019-09-26 PROCEDURE — 25000000 HC PHARMACY GENERAL: Performed by: PHYSICIAN ASSISTANT

## 2019-09-26 PROCEDURE — 0X943ZZ DRAINAGE OF RIGHT AXILLA, PERCUTANEOUS APPROACH: ICD-10-PCS | Performed by: EMERGENCY MEDICINE

## 2019-09-26 PROCEDURE — 10160 PNXR ASPIR ABSC HMTMA BULLA: CPT | Performed by: PHYSICIAN ASSISTANT

## 2019-09-26 PROCEDURE — 99282 EMERGENCY DEPT VISIT SF MDM: CPT | Mod: 25

## 2019-09-26 PROCEDURE — 87186 SC STD MICRODIL/AGAR DIL: CPT | Performed by: PHYSICIAN ASSISTANT

## 2019-09-26 RX ORDER — SULFAMETHOXAZOLE AND TRIMETHOPRIM 800; 160 MG/1; MG/1
1 TABLET ORAL 2 TIMES DAILY
Qty: 20 TABLET | Refills: 0 | Status: SHIPPED | OUTPATIENT
Start: 2019-09-26 | End: 2019-10-06

## 2019-09-26 RX ORDER — LIDOCAINE HYDROCHLORIDE 10 MG/ML
10 INJECTION, SOLUTION EPIDURAL; INFILTRATION; INTRACAUDAL; PERINEURAL ONCE
Status: COMPLETED | OUTPATIENT
Start: 2019-09-26 | End: 2019-09-26

## 2019-09-26 RX ADMIN — Medication 10 ML: at 11:15

## 2019-09-26 ASSESSMENT — ENCOUNTER SYMPTOMS
VOMITING: 0
FEVER: 0
NAUSEA: 0
WEAKNESS: 0
WOUND: 1
NUMBNESS: 0
CHILLS: 0

## 2019-09-26 NOTE — DISCHARGE INSTRUCTIONS
Start warm, moist compresses.  Take ibuprofen for pain control.  Mandatory follow-up with your PCP within 2 to 3 days.  Call their office today or tomorrow to schedule follow-up appointment.    If you are given antibiotics, you may not experience improvement until you have taken the medication for 2 or 3 days.  Finish the entire antibiotic course unless directed otherwise by your medical provider.    You may review the results on patient portal.  You may still have tests pending; if so, these results should be available on patient portal within 3 to 5 days.    If you develop new or worsening symptoms, or if you have any questions or concerns, reports the nearest emergency department.

## 2019-09-26 NOTE — ED ATTESTATION NOTE
The patient was evaluated and managed by the physician assistant.  I agree with the PA/NP’s history, physical, assessment, and plan of care, with the following exceptions: None         Bere Lowe MD  09/26/19 1944

## 2019-09-26 NOTE — ED PROVIDER NOTES
"HPI     Chief Complaint   Patient presents with   • Abscess     Patient states abscess under right armpit.       Patient with no self-reported past medical history presents with complaint of abscess in the right axilla that began about 4 days ago.  He states that it has increased significantly in size since onset.  He denies any associated symptoms, including fever, chills, nausea, vomiting.  There is no discharge from the site.  He states that he has had these in the past before and they have required incision and drainage.             Patient History     Past Medical History:   Diagnosis Date   • Abscess    • Axillary hyperhidrosis        History reviewed. No pertinent surgical history.    History reviewed. No pertinent family history.    Social History   Substance Use Topics   • Smoking status: Never Smoker   • Smokeless tobacco: Never Used   • Alcohol use Yes      Comment: socially       Systems Reviewed from Nursing Triage:          Review of Systems     Review of Systems   Constitutional: Negative for chills and fever.   Gastrointestinal: Negative for nausea and vomiting.   Skin: Positive for wound. Negative for pallor.   Neurological: Negative for weakness and numbness.        Physical Exam     ED Triage Vitals   Temp Heart Rate Resp BP SpO2   09/26/19 0838 09/26/19 0835 09/26/19 0835 09/26/19 0838 09/26/19 0835   36.9 °C (98.4 °F) 66 18 130/63 95 %      Temp Source Heart Rate Source Patient Position BP Location FiO2 (%) (Set)   09/26/19 0838 09/26/19 0835 09/26/19 0838 09/26/19 0838 --   Oral Monitor Lying Right upper arm                      Patient Vitals for the past 24 hrs:   BP Temp Temp src Pulse Resp SpO2 Height Weight   09/26/19 1149 (!) 109/58 - - - 20 98 % - -   09/26/19 1003 117/73 37.2 °C (98.9 °F) - 70 18 98 % - -   09/26/19 0838 130/63 36.9 °C (98.4 °F) Oral 63 18 - - -   09/26/19 0835 - - - 66 18 95 % 1.803 m (5' 11\") 72.6 kg (160 lb)           Physical Exam   Constitutional: He appears " well-developed. No distress.   HENT:   Head: Normocephalic and atraumatic.   Eyes: Conjunctivae are normal.   Cardiovascular: Normal rate and regular rhythm.    Pulmonary/Chest: Effort normal and breath sounds normal.   Abdominal: Soft. Bowel sounds are normal. He exhibits no distension. There is no tenderness. There is no rebound and no guarding.   Musculoskeletal:   There is a 4 x 2 cm area of fluctuance/induration to the right axilla.  There is no overlying erythema.  No crepitus.  Patient has full range of motion of the right upper extremity.   Neurological: He is alert. No sensory deficit.   Skin: Skin is warm and dry.   Psychiatric: He has a normal mood and affect. His behavior is normal.   Nursing note and vitals reviewed.           Incision and Drainage  Date/Time: 9/26/2019 11:51 AM  Performed by: VICENTE MEJIA  Authorized by: WILMAR REINA     Consent:     Consent obtained:  Verbal    Consent given by:  Patient    Risks discussed:  Bleeding, incomplete drainage and infection  Location:     Type:  Abscess    Size:  4x2    Location: right axilla.  Pre-procedure details:     Skin preparation:  Betadine  Anesthesia (see MAR for exact dosages):     Anesthesia method:  Local infiltration    Local anesthetic:  Lidocaine 1% w/o epi  Procedure type:     Complexity:  Simple  Procedure details:     Incision types:  Single straight    Scalpel blade:  11    Wound management:  Probed and deloculated and extensive cleaning    Drainage:  Purulent    Drainage amount:  Moderate    Packing materials:  1/2 in gauze  Post-procedure details:     Patient tolerance of procedure:  Tolerated well, no immediate complications        ED Course & MDM     Labs Reviewed   AEROBIC BACTERIAL CULTURE / SMEAR       No orders to display               MDM  Number of Diagnoses or Management Options  Abscess:   Diagnosis management comments: Patient given instructions to remove the packing in 2 days.  He should also be evaluated at this  time either here, by his PCP, or by the surgeon.           Clinical Impressions as of Sep 26 1152   Abscess        Loretta Ga PA C  09/26/19 1159

## 2019-09-29 LAB
GRAM STN SPEC: ABNORMAL
GRAM STN SPEC: ABNORMAL
MICROORGANISM SPEC CULT: ABNORMAL

## 2020-01-10 ENCOUNTER — HOSPITAL ENCOUNTER (EMERGENCY)
Facility: HOSPITAL | Age: 25
Discharge: HOME | End: 2020-01-10
Attending: EMERGENCY MEDICINE
Payer: COMMERCIAL

## 2020-01-10 VITALS
TEMPERATURE: 98.6 F | SYSTOLIC BLOOD PRESSURE: 132 MMHG | HEART RATE: 65 BPM | WEIGHT: 150 LBS | HEIGHT: 69 IN | DIASTOLIC BLOOD PRESSURE: 62 MMHG | OXYGEN SATURATION: 100 % | RESPIRATION RATE: 16 BRPM | BODY MASS INDEX: 22.22 KG/M2

## 2020-01-10 DIAGNOSIS — R11.11 NON-INTRACTABLE VOMITING WITHOUT NAUSEA, UNSPECIFIED VOMITING TYPE: Primary | ICD-10-CM

## 2020-01-10 PROCEDURE — 99281 EMR DPT VST MAYX REQ PHY/QHP: CPT

## 2020-01-10 PROCEDURE — 99282 EMERGENCY DEPT VISIT SF MDM: CPT

## 2020-01-10 ASSESSMENT — ENCOUNTER SYMPTOMS
NAUSEA: 0
ARTHRALGIAS: 0
BACK PAIN: 0
FEVER: 0
FREQUENCY: 0
BLOOD IN STOOL: 0
SEIZURES: 0
SHORTNESS OF BREATH: 0
DIARRHEA: 1
DYSURIA: 0
LIGHT-HEADEDNESS: 0
CHILLS: 0
HEMATURIA: 0
VOMITING: 1
PALPITATIONS: 0
ABDOMINAL PAIN: 1
WEAKNESS: 0
HEADACHES: 0
SORE THROAT: 0
DIZZINESS: 0
COLOR CHANGE: 0

## 2020-01-10 NOTE — ED PROVIDER NOTES
"HPI     Chief Complaint   Patient presents with   • Vomiting Blood       23 y/o M presents to ED c/o one episode of vomiting which occurred this morning. Pt states he became nauseous while brushing his teeth, began gagging and vomited. Pt noticed \"specks\" of blood following the episode. Pt states that his mild abdominal tenderness after vomiting, denies any nausea.  Patient denies any headaches, lightheadedness, dizziness, chest pain, shortness breath, change in urination, hematuria, or hematochezia.  Patient admits to light-colored watery diarrhea starting last night, states he has had 3 episodes.  Patient denies any fevers or recent illnesses.      History provided by:  Patient  Vomiting   Severity:  Moderate  Duration: \"this morning\"  Timing:  Sporadic  Number of daily episodes:  One   Progression:  Unchanged  Chronicity:  New  Recent urination:  Normal  Relieved by:  Nothing  Worsened by:  Nothing  Ineffective treatments:  None tried  Associated symptoms: abdominal pain and diarrhea    Associated symptoms: no arthralgias, no chills, no fever, no headaches, no sore throat and no URI    Abdominal pain:     Location:  Epigastric    Severity:  Moderate    Onset quality:  Sudden    Duration: since episode of vomiting.    Progression:  Unchanged    Chronicity:  New       Patient History     Past Medical History:   Diagnosis Date   • Abscess    • Axillary hyperhidrosis        History reviewed. No pertinent surgical history.    History reviewed. No pertinent family history.    Social History     Tobacco Use   • Smoking status: Never Smoker   • Smokeless tobacco: Never Used   Substance Use Topics   • Alcohol use: Not Currently     Comment: socially   • Drug use: Yes     Types: Marijuana       Systems Reviewed from Nursing Triage:          Review of Systems     Review of Systems   Constitutional: Negative for chills and fever.   HENT: Negative for ear pain and sore throat.    Eyes: Negative for visual disturbance. " "  Respiratory: Negative for shortness of breath.    Cardiovascular: Negative for chest pain and palpitations.   Gastrointestinal: Positive for abdominal pain, diarrhea and vomiting. Negative for blood in stool and nausea.   Genitourinary: Negative for dysuria, frequency and hematuria.   Musculoskeletal: Negative for arthralgias and back pain.   Skin: Negative for color change and rash.   Neurological: Negative for dizziness, seizures, syncope, weakness, light-headedness and headaches.   All other systems reviewed and are negative.       Physical Exam     ED Triage Vitals   Temp Heart Rate Resp BP SpO2   01/10/20 0928 01/10/20 0919 01/10/20 0919 01/10/20 0928 01/10/20 0919   37 °C (98.6 °F) 65 16 132/62 100 %      Temp Source Heart Rate Source Patient Position BP Location FiO2 (%) (Set)   01/10/20 0928 -- 01/10/20 0928 01/10/20 0928 --   Oral  Sitting Left upper arm                      Patient Vitals for the past 24 hrs:   BP Temp Temp src Pulse Resp SpO2 Height Weight   01/10/20 0928 132/62 37 °C (98.6 °F) Oral -- -- -- -- --   01/10/20 0919 -- -- -- 65 16 100 % 1.753 m (5' 9\") 68 kg (150 lb)                                       Physical Exam   Constitutional: He is oriented to person, place, and time. He appears well-developed and well-nourished. No distress.   HENT:   Head: Normocephalic and atraumatic.   Eyes: Pupils are equal, round, and reactive to light. Conjunctivae and EOM are normal.   Neck: Normal range of motion. Neck supple. No tracheal deviation present.   Cardiovascular: Normal rate, regular rhythm, normal heart sounds and intact distal pulses.   No murmur heard.  Pulmonary/Chest: Effort normal and breath sounds normal. No stridor. No respiratory distress. He has no wheezes.   Abdominal: Soft. Bowel sounds are normal. He exhibits no distension. There is no tenderness. There is no rebound and no guarding.   Musculoskeletal: Normal range of motion. He exhibits no edema.   Neurological: He is alert and " oriented to person, place, and time.   Skin: Skin is warm and dry. Capillary refill takes less than 2 seconds.   Psychiatric: He has a normal mood and affect. His behavior is normal.   Nursing note and vitals reviewed.           Procedures    ED Course & MDM     Labs Reviewed - No data to display    No orders to display               MDM  Number of Diagnoses or Management Options  Non-intractable vomiting without nausea, unspecified vomiting type:   Diagnosis management comments: 23 y/o M presents to ED c/o one episode of vomiting which occurred this morning.  Differential diagnosis is broad.  Patient denies any recent fevers or illnesses.  Abdominal exam is benign.  Vital signs are stable.  Patient is afebrile here.  Will hold off on blood work and imaging at this time.  Patient symptoms most likely due to gag reflex. Vital signs are stable, pt discharged home. Pt given strict precautions to return to the ED. Pt to follow-up with PCP in 2 days as well as any other providers as indicated.       By signing my name below, I, Lesly Amaral, attest that this documentation has been prepared under the direction and in the presence of June Yancey PA-C.    1/10/2020 10:24 AM           Clinical Impressions as of Carlos 10 1028   Non-intractable vomiting without nausea, unspecified vomiting type        Lesly Amaral  01/10/20 1024       Juana Yancey PA C  01/10/20 1028

## 2020-01-10 NOTE — ED ATTESTATION NOTE
I saw this patient in concert with the physician assistant, I have evaluated the patient with the physician assistant and I attest to the history, and physical exam, review of systems, medical decision-making, And final disposition of this patient with the physician assistant.     Cesar Cote, DO  01/10/20 1339

## 2020-01-10 NOTE — DISCHARGE INSTRUCTIONS
Please follow up as directed to obtain any final results and review your plan of care. CALL your primary doctor's office today to schedule a follow up appointment within the next 48 hours.       REVIEW AND DISCUSS ALL OF YOUR MEDICATIONS WITH YOUR PRIMARY CARE TEAM WITHIN THE NEXT 2 DAYS, even ones that you may have been on for a long time.      Radiology review of your studies (XRAYs, CT Scans, Ultrasounds, MRI scans) may still be pending. Preliminary results may be available today but final written reports may not be available until tomorrow. Important findings may be included in the final radiology review.       If instructed please CALL the Emergency Department at [phone] to obtain the final results within the next 24 hours. Review the final results of all of your tests with your primary care doctor within the next 2 days.      Cultures and uncommon labs may take 2 days or more before results are available. Please ask about all pending tests until the final results are known. You may not be notified of important test results unless you ask for these when you call or when you follow up.      **PLEASE RETURN TO THE EMERGENCY DEPARTMENT IF YOU DEVELOP ANY NEW OR WORSENING SYMPTOMS**

## 2020-04-06 ENCOUNTER — APPOINTMENT (EMERGENCY)
Dept: RADIOLOGY | Facility: HOSPITAL | Age: 25
End: 2020-04-06
Attending: SURGERY
Payer: COMMERCIAL

## 2020-04-06 ENCOUNTER — APPOINTMENT (EMERGENCY)
Dept: RADIOLOGY | Facility: HOSPITAL | Age: 25
End: 2020-04-06
Attending: EMERGENCY MEDICINE
Payer: COMMERCIAL

## 2020-04-06 ENCOUNTER — HOSPITAL ENCOUNTER (EMERGENCY)
Facility: HOSPITAL | Age: 25
Discharge: HOME | End: 2020-04-06
Attending: EMERGENCY MEDICINE
Payer: COMMERCIAL

## 2020-04-06 VITALS
TEMPERATURE: 99.3 F | WEIGHT: 150 LBS | OXYGEN SATURATION: 98 % | DIASTOLIC BLOOD PRESSURE: 64 MMHG | HEIGHT: 70 IN | SYSTOLIC BLOOD PRESSURE: 137 MMHG | RESPIRATION RATE: 14 BRPM | HEART RATE: 86 BPM | BODY MASS INDEX: 21.47 KG/M2
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
## 2020-04-06 LAB
ABO + RH BLD: NORMAL
APTT PPP: 26 SEC (ref 23–35)
BLD GP AB SCN SERPL QL: NEGATIVE
D AG BLD QL: POSITIVE
ETHANOL SERPL-MCNC: <5 MG/DL
INR PPP: 1.1 INR
LABORATORY COMMENT REPORT: NORMAL
LACTATE SERPL-SCNC: 2.2 MMOL/L (ref 0.4–2)
LACTATE SERPL-SCNC: >13 MMOL/L (ref 0.4–2)
PROTHROMBIN TIME: 13.5 SEC (ref 12.2–14.5)

## 2020-04-06 PROCEDURE — 72170 X-RAY EXAM OF PELVIS: CPT

## 2020-04-06 PROCEDURE — 68200000 HC TRAUMA RSPNS W/O CRIT CARE

## 2020-04-06 PROCEDURE — 85610 PROTHROMBIN TIME: CPT | Performed by: PHYSICIAN ASSISTANT

## 2020-04-06 PROCEDURE — 3E0234Z INTRODUCTION OF SERUM, TOXOID AND VACCINE INTO MUSCLE, PERCUTANEOUS APPROACH: ICD-10-PCS | Performed by: EMERGENCY MEDICINE

## 2020-04-06 PROCEDURE — 86900 BLOOD TYPING SEROLOGIC ABO: CPT

## 2020-04-06 PROCEDURE — 90715 TDAP VACCINE 7 YRS/> IM: CPT

## 2020-04-06 PROCEDURE — 3E0337Z INTRODUCTION OF ELECTROLYTIC AND WATER BALANCE SUBSTANCE INTO PERIPHERAL VEIN, PERCUTANEOUS APPROACH: ICD-10-PCS | Performed by: EMERGENCY MEDICINE

## 2020-04-06 PROCEDURE — 25800000 HC PHARMACY IV SOLUTIONS: Performed by: EMERGENCY MEDICINE

## 2020-04-06 PROCEDURE — 3E0333Z INTRODUCTION OF ANTI-INFLAMMATORY INTO PERIPHERAL VEIN, PERCUTANEOUS APPROACH: ICD-10-PCS | Performed by: EMERGENCY MEDICINE

## 2020-04-06 PROCEDURE — 63600000 HC DRUGS/DETAIL CODE: Performed by: SURGERY

## 2020-04-06 PROCEDURE — G0480 DRUG TEST DEF 1-7 CLASSES: HCPCS | Performed by: PHYSICIAN ASSISTANT

## 2020-04-06 PROCEDURE — 36415 COLL VENOUS BLD VENIPUNCTURE: CPT | Performed by: PHYSICIAN ASSISTANT

## 2020-04-06 PROCEDURE — 83605 ASSAY OF LACTIC ACID: CPT | Performed by: EMERGENCY MEDICINE

## 2020-04-06 PROCEDURE — 85025 COMPLETE CBC W/AUTO DIFF WBC: CPT | Performed by: PHYSICIAN ASSISTANT

## 2020-04-06 PROCEDURE — 99285 EMERGENCY DEPT VISIT HI MDM: CPT | Mod: 25

## 2020-04-06 PROCEDURE — 73552 X-RAY EXAM OF FEMUR 2/>: CPT | Mod: RT

## 2020-04-06 PROCEDURE — 96361 HYDRATE IV INFUSION ADD-ON: CPT

## 2020-04-06 PROCEDURE — 90471 IMMUNIZATION ADMIN: CPT

## 2020-04-06 PROCEDURE — 71045 X-RAY EXAM CHEST 1 VIEW: CPT

## 2020-04-06 PROCEDURE — 85730 THROMBOPLASTIN TIME PARTIAL: CPT | Performed by: PHYSICIAN ASSISTANT

## 2020-04-06 PROCEDURE — 63700000 HC SELF-ADMINISTRABLE DRUG: Performed by: EMERGENCY MEDICINE

## 2020-04-06 PROCEDURE — 83605 ASSAY OF LACTIC ACID: CPT | Performed by: PHYSICIAN ASSISTANT

## 2020-04-06 PROCEDURE — 96374 THER/PROPH/DIAG INJ IV PUSH: CPT | Mod: 59

## 2020-04-06 PROCEDURE — 63600105 HC IODINE BASED CONTRAST: Performed by: EMERGENCY MEDICINE

## 2020-04-06 PROCEDURE — 80053 COMPREHEN METABOLIC PANEL: CPT | Performed by: PHYSICIAN ASSISTANT

## 2020-04-06 PROCEDURE — 63600000 HC DRUGS/DETAIL CODE

## 2020-04-06 PROCEDURE — 63600000 HC DRUGS/DETAIL CODE: Performed by: EMERGENCY MEDICINE

## 2020-04-06 PROCEDURE — 73706 CT ANGIO LWR EXTR W/O&W/DYE: CPT | Mod: RT

## 2020-04-06 PROCEDURE — 96360 HYDRATION IV INFUSION INIT: CPT | Mod: 59

## 2020-04-06 RX ORDER — KETOROLAC TROMETHAMINE 30 MG/ML
30 INJECTION, SOLUTION INTRAMUSCULAR; INTRAVENOUS ONCE
Status: COMPLETED | OUTPATIENT
Start: 2020-04-06 | End: 2020-04-06

## 2020-04-06 RX ORDER — NAPROXEN 500 MG/1
500 TABLET ORAL 2 TIMES DAILY PRN
Qty: 20 TABLET | Refills: 0 | Status: SHIPPED | OUTPATIENT
Start: 2020-04-06 | End: 2020-04-06 | Stop reason: SDUPTHER

## 2020-04-06 RX ORDER — NAPROXEN 500 MG/1
500 TABLET ORAL 2 TIMES DAILY PRN
Qty: 20 TABLET | Refills: 0 | Status: SHIPPED | OUTPATIENT
Start: 2020-04-06 | End: 2021-06-26

## 2020-04-06 RX ORDER — CEFAZOLIN SODIUM 1 G/50ML
SOLUTION INTRAVENOUS
Status: DISCONTINUED
Start: 2020-04-06 | End: 2020-04-07 | Stop reason: HOSPADM

## 2020-04-06 RX ORDER — FENTANYL CITRATE 50 UG/ML
INJECTION, SOLUTION INTRAMUSCULAR; INTRAVENOUS
Status: COMPLETED | OUTPATIENT
Start: 2020-04-06 | End: 2020-04-06

## 2020-04-06 RX ORDER — OXYCODONE AND ACETAMINOPHEN 5; 325 MG/1; MG/1
2 TABLET ORAL ONCE
Status: COMPLETED | OUTPATIENT
Start: 2020-04-06 | End: 2020-04-06

## 2020-04-06 RX ADMIN — SODIUM CHLORIDE, POTASSIUM CHLORIDE, SODIUM LACTATE AND CALCIUM CHLORIDE 1000 ML: 600; 310; 30; 20 INJECTION, SOLUTION INTRAVENOUS at 20:22

## 2020-04-06 RX ADMIN — OXYCODONE HYDROCHLORIDE AND ACETAMINOPHEN 2 TABLET: 5; 325 TABLET ORAL at 21:08

## 2020-04-06 RX ADMIN — FENTANYL CITRATE 100 MCG: 50 INJECTION, SOLUTION INTRAMUSCULAR; INTRAVENOUS at 19:45

## 2020-04-06 RX ADMIN — TETANUS TOXOID, REDUCED DIPHTHERIA TOXOID AND ACELLULAR PERTUSSIS VACCINE, ADSORBED 0.5 ML: 5; 2.5; 8; 8; 2.5 SUSPENSION INTRAMUSCULAR at 19:50

## 2020-04-06 RX ADMIN — KETOROLAC TROMETHAMINE 30 MG: 30 INJECTION, SOLUTION INTRAMUSCULAR at 21:43

## 2020-04-06 RX ADMIN — IOHEXOL 100 ML: 350 INJECTION, SOLUTION INTRAVENOUS at 20:45

## 2020-04-06 RX ADMIN — SODIUM CHLORIDE 1000 ML: 900 INJECTION, SOLUTION INTRAVENOUS at 19:53
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
## 2020-04-07 NOTE — PROGRESS NOTES
ACTIVATION/Consult TIMES     Activation or Time Notified: 19:37  Patient Arrival Time: 19:37 Level of Trauma: CODE 1   Trauma Arrival Time: 19:42 Time Patient Seen: 19:42     CONSULTS      Consultant Emergent  Urgent    Routine Time Paged Time Responded EMERGENT Arrival Time                        *emergent requires <30 minutes response on site; urgent requires <12 hours response on site.
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
## 2020-04-08 LAB
ALBUMIN SERPL-MCNC: 4.7 G/DL (ref 3.4–5)
ALP SERPL-CCNC: 57 IU/L (ref 35–126)
ALT SERPL-CCNC: 20 IU/L (ref 16–63)
ANION GAP SERPL CALC-SCNC: 27 MEQ/L (ref 3–15)
AST SERPL-CCNC: 34 IU/L (ref 15–41)
BASOPHILS # BLD: 0.03 K/UL (ref 0.01–0.1)
BASOPHILS NFR BLD: 0.3 %
BILIRUB SERPL-MCNC: 1 MG/DL (ref 0.3–1.2)
BUN SERPL-MCNC: 8 MG/DL (ref 8–20)
CALCIUM SERPL-MCNC: 9.6 MG/DL (ref 8.9–10.3)
CHLORIDE SERPL-SCNC: 104 MEQ/L (ref 98–109)
CO2 SERPL-SCNC: 10 MEQ/L (ref 22–32)
CREAT SERPL-MCNC: 1.5 MG/DL (ref 0.8–1.3)
DIFFERENTIAL METHOD BLD: ABNORMAL
EOSINOPHIL # BLD: 0.15 K/UL (ref 0.04–0.54)
EOSINOPHIL NFR BLD: 1.5 %
ERYTHROCYTE [DISTWIDTH] IN BLOOD BY AUTOMATED COUNT: 11.9 % (ref 11.6–14.4)
GFR SERPL CREATININE-BSD FRML MDRD: >60 ML/MIN/1.73M*2
GLUCOSE SERPL-MCNC: 102 MG/DL (ref 70–99)
HCT VFR BLDCO AUTO: 48.9 % (ref 40.1–51)
HGB BLD-MCNC: 16 G/DL (ref 13.7–17.5)
IMM GRANULOCYTES # BLD AUTO: 0.02 K/UL (ref 0–0.08)
IMM GRANULOCYTES NFR BLD AUTO: 0.2 %
LYMPHOCYTES # BLD: 4.91 K/UL (ref 1.2–3.5)
LYMPHOCYTES NFR BLD: 50.7 %
MCH RBC QN AUTO: 32 PG (ref 28–33.2)
MCHC RBC AUTO-ENTMCNC: 32.7 G/DL (ref 32.2–36.5)
MCV RBC AUTO: 97.8 FL (ref 83–98)
MONOCYTES # BLD: 1.24 K/UL (ref 0.3–1)
MONOCYTES NFR BLD: 12.8 %
NEUTROPHILS # BLD: 3.34 K/UL (ref 1.7–7)
NEUTS SEG NFR BLD: 34.5 %
NRBC BLD-RTO: 0 %
PDW BLD AUTO: 12.1 FL (ref 9.4–12.4)
PLATELET # BLD AUTO: 257 K/UL (ref 150–350)
POTASSIUM SERPL-SCNC: 3.1 MEQ/L (ref 3.6–5.1)
PROT SERPL-MCNC: 7.3 G/DL (ref 6–8.2)
RBC # BLD AUTO: 5 M/UL (ref 4.5–5.8)
SODIUM SERPL-SCNC: 141 MEQ/L (ref 136–144)
WBC # BLD AUTO: 9.69 K/UL (ref 3.8–10.5)

## 2020-08-11 ENCOUNTER — APPOINTMENT (RX ONLY)
Dept: URBAN - METROPOLITAN AREA CLINIC 28 | Facility: CLINIC | Age: 25
Setting detail: DERMATOLOGY
End: 2020-08-11

## 2020-08-11 DIAGNOSIS — L74.510 PRIMARY FOCAL HYPERHIDROSIS, AXILLA: ICD-10-CM

## 2020-08-11 PROCEDURE — ? OTHER

## 2020-08-11 PROCEDURE — 99213 OFFICE O/P EST LOW 20 MIN: CPT

## 2020-08-11 PROCEDURE — ? COUNSELING

## 2020-08-11 PROCEDURE — ? PRESCRIPTION

## 2020-08-11 RX ORDER — ALUMINUM CHLORIDE 20 %
SOLUTION, NON-ORAL TOPICAL QHS
Qty: 1 | Refills: 3 | Status: ERX | COMMUNITY
Start: 2020-08-11 | End: 2021-08-10

## 2020-08-11 RX ORDER — GLYCOPYRROLATE 2 MG/1
TABLET ORAL
Qty: 60 | Refills: 4 | Status: ERX | COMMUNITY
Start: 2020-08-11 | End: 2021-08-10

## 2020-08-11 RX ADMIN — Medication: at 00:00

## 2020-08-11 RX ADMIN — GLYCOPYRROLATE: 2 TABLET ORAL at 00:00

## 2020-08-11 ASSESSMENT — LOCATION ZONE DERM: LOCATION ZONE: AXILLAE

## 2020-08-11 ASSESSMENT — LOCATION SIMPLE DESCRIPTION DERM
LOCATION SIMPLE: RIGHT AXILLARY VAULT
LOCATION SIMPLE: LEFT AXILLARY VAULT

## 2020-08-11 ASSESSMENT — LOCATION DETAILED DESCRIPTION DERM
LOCATION DETAILED: LEFT AXILLARY VAULT
LOCATION DETAILED: RIGHT AXILLARY VAULT

## 2020-08-11 NOTE — PROCEDURE: OTHER
Detail Level: Zone
Other (Free Text): Will refer patient to plastic surgeon for Miradry procedure if not improved in one month with current treatment.
Note Text (......Xxx Chief Complaint.): This diagnosis correlates with the

## 2020-08-11 NOTE — HPI: SWEATING (HYPERHIDROSIS)
How Severe Is It?: moderate
Sweating Severity Scale: 4- The sweating is intolerable and always interferes with daily activities
Is This A New Presentation, Or A Follow-Up?: Follow Up Hyperhidrosis
Additional History: Patient states that none of the previous treatments that he has tried have worked for him.

## 2020-08-11 NOTE — PROCEDURE: COUNSELING
Detail Level: Detailed
Medical Necessity Information: LCD Guidelines vary from payer to payer. Please check with your payer's policy to determine medical necessity.
Medical Necessity Clause: Botulinum toxin hyperhidrosis therapy is medically necessary because she has constant dripping sweat in both hands\\nNothing has ever worked\\nno response to drysol \\nno response to qbrexa

## 2020-09-15 ENCOUNTER — APPOINTMENT (RX ONLY)
Dept: URBAN - METROPOLITAN AREA CLINIC 28 | Facility: CLINIC | Age: 25
Setting detail: DERMATOLOGY
End: 2020-09-15

## 2020-09-15 DIAGNOSIS — L74.510 PRIMARY FOCAL HYPERHIDROSIS, AXILLA: ICD-10-CM | Status: UNCHANGED

## 2020-09-15 PROCEDURE — ? COUNSELING

## 2020-09-15 PROCEDURE — 99213 OFFICE O/P EST LOW 20 MIN: CPT

## 2020-09-15 PROCEDURE — ? DEFER

## 2020-09-15 ASSESSMENT — LOCATION DETAILED DESCRIPTION DERM
LOCATION DETAILED: LEFT AXILLARY VAULT
LOCATION DETAILED: RIGHT AXILLARY VAULT

## 2020-09-15 ASSESSMENT — LOCATION SIMPLE DESCRIPTION DERM
LOCATION SIMPLE: RIGHT AXILLARY VAULT
LOCATION SIMPLE: LEFT AXILLARY VAULT

## 2020-09-15 ASSESSMENT — LOCATION ZONE DERM: LOCATION ZONE: AXILLAE

## 2020-09-15 NOTE — PROCEDURE: DEFER
Introduction Text (Please End With A Colon): Optional evaluation in office
Procedure To Be Performed At Next Visit: Botox
Detail Level: Detailed

## 2020-09-24 ENCOUNTER — RX ONLY (OUTPATIENT)
Age: 25
Setting detail: RX ONLY
End: 2020-09-24

## 2020-09-24 RX ORDER — ONABOTULINUMTOXINA 100 [USP'U]/1
INJECTION, POWDER, LYOPHILIZED, FOR SOLUTION INTRADERMAL; INTRAMUSCULAR
Qty: 2 | Refills: 3 | Status: ERX | COMMUNITY
Start: 2020-09-24

## 2021-06-26 ENCOUNTER — HOSPITAL ENCOUNTER (EMERGENCY)
Facility: HOSPITAL | Age: 26
Discharge: HOME | End: 2021-06-26
Attending: EMERGENCY MEDICINE | Admitting: EMERGENCY MEDICINE
Payer: COMMERCIAL

## 2021-06-26 VITALS
BODY MASS INDEX: 23.7 KG/M2 | RESPIRATION RATE: 14 BRPM | HEIGHT: 69 IN | OXYGEN SATURATION: 99 % | SYSTOLIC BLOOD PRESSURE: 127 MMHG | DIASTOLIC BLOOD PRESSURE: 78 MMHG | TEMPERATURE: 97.5 F | HEART RATE: 64 BPM | WEIGHT: 160 LBS

## 2021-06-26 DIAGNOSIS — R19.7 DIARRHEA, UNSPECIFIED TYPE: ICD-10-CM

## 2021-06-26 DIAGNOSIS — R10.84 GENERALIZED ABDOMINAL PAIN: Primary | ICD-10-CM

## 2021-06-26 LAB
ALBUMIN SERPL-MCNC: 4.3 G/DL (ref 3.4–5)
ALP SERPL-CCNC: 43 IU/L (ref 35–126)
ALT SERPL-CCNC: 16 IU/L (ref 16–63)
ANION GAP SERPL CALC-SCNC: 10 MEQ/L (ref 3–15)
AST SERPL-CCNC: 20 IU/L (ref 15–41)
BASOPHILS # BLD: 0 K/UL (ref 0.01–0.1)
BASOPHILS NFR BLD: 0 %
BILIRUB SERPL-MCNC: 1.7 MG/DL (ref 0.3–1.2)
BILIRUB UR QL STRIP.AUTO: NEGATIVE MG/DL
BUN SERPL-MCNC: 7 MG/DL (ref 8–20)
BURR CELLS BLD QL SMEAR: ABNORMAL
CALCIUM SERPL-MCNC: 9.6 MG/DL (ref 8.9–10.3)
CHLORIDE SERPL-SCNC: 101 MEQ/L (ref 98–109)
CLARITY UR REFRACT.AUTO: CLEAR
CO2 SERPL-SCNC: 26 MEQ/L (ref 22–32)
COLOR UR AUTO: YELLOW
CREAT SERPL-MCNC: 1.2 MG/DL (ref 0.8–1.3)
DACRYOCYTES BLD QL SMEAR: ABNORMAL
DIFFERENTIAL METHOD BLD: ABNORMAL
EOSINOPHIL # BLD: 0.03 K/UL (ref 0.04–0.54)
EOSINOPHIL NFR BLD: 0.9 %
ERYTHROCYTE [DISTWIDTH] IN BLOOD BY AUTOMATED COUNT: 11.7 % (ref 11.6–14.4)
GFR SERPL CREATININE-BSD FRML MDRD: >60 ML/MIN/1.73M*2
GLUCOSE SERPL-MCNC: 94 MG/DL (ref 70–99)
GLUCOSE UR STRIP.AUTO-MCNC: NEGATIVE MG/DL
HCT VFR BLDCO AUTO: 48.6 % (ref 40.1–51)
HGB BLD-MCNC: 16.8 G/DL (ref 13.7–17.5)
HGB UR QL STRIP.AUTO: NEGATIVE
IMM GRANULOCYTES # BLD AUTO: 0 K/UL (ref 0–0.08)
IMM GRANULOCYTES NFR BLD AUTO: 0 %
KETONES UR STRIP.AUTO-MCNC: NEGATIVE MG/DL
LEUKOCYTE ESTERASE UR QL STRIP.AUTO: NEGATIVE
LIPASE SERPL-CCNC: 28 U/L (ref 20–51)
LYMPHOCYTES # BLD: 1.37 K/UL (ref 1.2–3.5)
LYMPHOCYTES NFR BLD: 42.7 %
MCH RBC QN AUTO: 31.8 PG (ref 28–33.2)
MCHC RBC AUTO-ENTMCNC: 34.6 G/DL (ref 32.2–36.5)
MCV RBC AUTO: 91.9 FL (ref 83–98)
MONOCYTES # BLD: 0.49 K/UL (ref 0.3–1)
MONOCYTES NFR BLD: 15.3 %
NEUTROPHILS # BLD: 1.32 K/UL (ref 1.7–7)
NEUTS SEG NFR BLD: 41.1 %
NITRITE UR QL STRIP.AUTO: NEGATIVE
NRBC BLD-RTO: 0 %
PDW BLD AUTO: 11.4 FL (ref 9.4–12.4)
PH UR STRIP.AUTO: 7 [PH]
PLAT MORPH BLD: NORMAL
PLATELET # BLD AUTO: 197 K/UL (ref 150–350)
PLATELET # BLD EST: ABNORMAL 10*3/UL
POTASSIUM SERPL-SCNC: 4 MEQ/L (ref 3.6–5.1)
PROT SERPL-MCNC: 7 G/DL (ref 6–8.2)
PROT UR QL STRIP.AUTO: NEGATIVE
RBC # BLD AUTO: 5.29 M/UL (ref 4.5–5.8)
SODIUM SERPL-SCNC: 137 MEQ/L (ref 136–144)
SP GR UR REFRACT.AUTO: 1.01
TARGETS BLD QL SMEAR: ABNORMAL
UROBILINOGEN UR STRIP-ACNC: 0.2 EU/DL
WBC # BLD AUTO: 3.21 K/UL (ref 3.8–10.5)

## 2021-06-26 PROCEDURE — 83690 ASSAY OF LIPASE: CPT | Performed by: EMERGENCY MEDICINE

## 2021-06-26 PROCEDURE — 87491 CHLMYD TRACH DNA AMP PROBE: CPT | Performed by: EMERGENCY MEDICINE

## 2021-06-26 PROCEDURE — 80053 COMPREHEN METABOLIC PANEL: CPT | Performed by: EMERGENCY MEDICINE

## 2021-06-26 PROCEDURE — 3E0337Z INTRODUCTION OF ELECTROLYTIC AND WATER BALANCE SUBSTANCE INTO PERIPHERAL VEIN, PERCUTANEOUS APPROACH: ICD-10-PCS | Performed by: EMERGENCY MEDICINE

## 2021-06-26 PROCEDURE — 81003 URINALYSIS AUTO W/O SCOPE: CPT | Performed by: EMERGENCY MEDICINE

## 2021-06-26 PROCEDURE — 87591 N.GONORRHOEAE DNA AMP PROB: CPT | Performed by: EMERGENCY MEDICINE

## 2021-06-26 PROCEDURE — 99284 EMERGENCY DEPT VISIT MOD MDM: CPT | Mod: 25

## 2021-06-26 PROCEDURE — 85025 COMPLETE CBC W/AUTO DIFF WBC: CPT | Performed by: EMERGENCY MEDICINE

## 2021-06-26 PROCEDURE — 36415 COLL VENOUS BLD VENIPUNCTURE: CPT | Performed by: EMERGENCY MEDICINE

## 2021-06-26 PROCEDURE — 25800000 HC PHARMACY IV SOLUTIONS: Performed by: EMERGENCY MEDICINE

## 2021-06-26 PROCEDURE — 96360 HYDRATION IV INFUSION INIT: CPT

## 2021-06-26 RX ADMIN — SODIUM CHLORIDE 1000 ML: 9 INJECTION, SOLUTION INTRAVENOUS at 13:22

## 2021-06-26 ASSESSMENT — ENCOUNTER SYMPTOMS
DIARRHEA: 0
ANOREXIA: 0
FLATUS: 1
SHORTNESS OF BREATH: 0
BACK PAIN: 0
CONSTIPATION: 0
DIZZINESS: 0
DYSURIA: 0
HEADACHES: 0
VOMITING: 0
CHILLS: 0
FEVER: 0
HEMATURIA: 0
SORE THROAT: 0
WEAKNESS: 0
CHEST TIGHTNESS: 0
COUGH: 0
ABDOMINAL PAIN: 1
HEMATOCHEZIA: 0
NAUSEA: 0
FATIGUE: 0
ACTIVITY CHANGE: 0

## 2021-06-26 NOTE — ED PROVIDER NOTES
"Emergency Medicine Note  HPI   HISTORY OF PRESENT ILLNESS     Seen and Examined in ED 17    This is a 25-year-old presenting to the emergency department for evaluation of left upper quadrant pain over the past 1 1/2 weeks.  Endorses diarrhea, states it feels as if a throbbing sensation.     Stating pain is constant in nature worse with movement and activity. No nausea vomiting. BM stating are looser then usual no blood or mucus noted in BM.     Urination noted to be intermietnely hesitant. No pain with urination. No penile discharge. Sexually active. With one partner doesn't use protection.     Had been to UC 1 week prior treated for \"UTI with abx\"       History provided by:  Patient  Abdominal Pain  Pain location:  Suprapubic  Pain quality: gnawing    Pain radiates to:  Groin  Pain severity:  Moderate  Onset quality:  Gradual  Timing:  Constant  Progression:  Waxing and waning  Chronicity:  New  Relieved by:  None tried  Worsened by:  Movement and palpation  Ineffective treatments:  NSAIDs  Associated symptoms: flatus    Associated symptoms: no anorexia, no chest pain, no chills, no constipation, no cough, no diarrhea, no dysuria, no fatigue, no fever, no hematochezia, no hematuria, no nausea, no shortness of breath, no sore throat and no vomiting          Patient History   PAST HISTORY     Reviewed from Nursing Triage:      Past Medical History:   Diagnosis Date   • Abscess    • Axillary hyperhidrosis        No past surgical history on file.    No family history on file.    Social History     Tobacco Use   • Smoking status: Never Smoker   • Smokeless tobacco: Never Used   Substance Use Topics   • Alcohol use: Not Currently     Comment: socially   • Drug use: Yes     Types: Marijuana         Review of Systems   REVIEW OF SYSTEMS     Review of Systems   Constitutional: Negative for activity change, chills, fatigue and fever.   HENT: Negative for sore throat.    Eyes: Negative for visual disturbance.   Respiratory: " Negative for cough, chest tightness and shortness of breath.    Cardiovascular: Negative for chest pain and leg swelling.   Gastrointestinal: Positive for abdominal pain and flatus. Negative for anorexia, constipation, diarrhea, hematochezia, nausea and vomiting.   Endocrine: Negative for polyuria.   Genitourinary: Negative for dysuria and hematuria.   Musculoskeletal: Negative for back pain.   Skin: Negative for rash.   Neurological: Negative for dizziness, weakness and headaches.   All other systems reviewed and are negative.        VITALS     ED Vitals    Date/Time Temp Pulse Resp BP SpO2 [facility]   06/26/21 1116 36.4 °C (97.5 °F) 65 18 128/73 97 % IP                       Physical Exam   PHYSICAL EXAM     Physical Exam  Vitals and nursing note reviewed.   Constitutional:       General: He is not in acute distress.     Appearance: He is well-developed. He is not ill-appearing, toxic-appearing or diaphoretic.   HENT:      Head: Normocephalic and atraumatic.   Eyes:      Extraocular Movements: Extraocular movements intact.   Cardiovascular:      Rate and Rhythm: Normal rate and regular rhythm.      Heart sounds: Normal heart sounds.   Pulmonary:      Effort: Pulmonary effort is normal.      Breath sounds: Normal breath sounds.   Abdominal:      General: Abdomen is flat. Bowel sounds are normal. There is no distension.      Palpations: Abdomen is soft.      Tenderness: There is abdominal tenderness in the left lower quadrant. There is no guarding.      Hernia: No hernia is present.   Genitourinary:     Penis: Normal.       Testes: Normal.         Right: Tenderness not present.         Left: Tenderness not present.   Musculoskeletal:         General: Normal range of motion.      Cervical back: Neck supple.   Skin:     General: Skin is warm and dry.   Neurological:      Mental Status: He is alert and oriented to person, place, and time.           PROCEDURES     Procedures     DATA     Results     None          Imaging  Results    None         No orders to display       Scoring tools                                 ED Course & MDM   MDM / ED COURSE and CLINICAL IMPRESSIONS     MDM  Number of Diagnoses or Management Options  Diarrhea, unspecified type  Generalized abdominal pain  Diagnosis management comments: This is a 25-year-old male presented to the emergency department for evaluation abdominal pain.  Urgent care, as well as treated with antibiotics.  With continuous symptoms, and loose stools noted.  The patient on exam is nontoxic in appearance.  Cardiopulmonary examination is unremarkable.  Urinalysis without acute findings.  And blood work with mild leukopenia, as well as mildly elevated bilirubin.  Have discussed with patient following up with primary care, as well as GI.  He voices understanding.  At this point I feel that CT imaging would not be indicated.  Initial differential diagnosis include but not limited to ureter infection, STD, hernia, low suspicion for appendicitis, diverticulitis.       Amount and/or Complexity of Data Reviewed  Clinical lab tests: reviewed  Tests in the radiology section of CPT®: reviewed and ordered  Decide to obtain previous medical records or to obtain history from someone other than the patient: yes        ED Course as of Jun 26 1440   Sat Jun 26, 2021   1439 With the leukopenia noted I have discussed with the patient.  I have offered Covid testing given the vague symptoms.  However he is refusing Covid testing at this time    [TL]   1440 No right upper quadrant pain   Bilirubin, Total(!): 1.7 [TL]      ED Course User Index  [TL] Reese Barahona DO         Clinical Impressions as of Jun 26 1440   Generalized abdominal pain   Diarrhea, unspecified type            Reese Barahona DO  06/26/21 1442

## 2021-06-26 NOTE — DISCHARGE INSTRUCTIONS
After your visit your pain should be improving. If it is not starting to get better in the next 12 to 24 hours then you need to come back to the Emergency Room for a recheck. Return immediately for pain that is getting worse.    Often we are unable to determine the cause of abdominal pain during your first visit. Sometimes your symptoms will go away in which case we don't need to do any additional evaluation. Other times your symptoms will change and these changes help us figure out why you are sick.

## 2021-06-28 LAB
C TRACH RRNA SPEC QL NAA+PROBE: NEGATIVE
N GONORRHOEA RRNA SPEC QL NAA+PROBE: NEGATIVE

## 2021-08-10 ENCOUNTER — APPOINTMENT (RX ONLY)
Dept: URBAN - METROPOLITAN AREA CLINIC 28 | Facility: CLINIC | Age: 26
Setting detail: DERMATOLOGY
End: 2021-08-10

## 2021-08-10 DIAGNOSIS — L72.0 EPIDERMAL CYST: ICD-10-CM

## 2021-08-10 DIAGNOSIS — L74.51 PRIMARY FOCAL HYPERHIDROSIS: ICD-10-CM

## 2021-08-10 PROBLEM — L74.510 PRIMARY FOCAL HYPERHIDROSIS, AXILLA: Status: ACTIVE | Noted: 2021-08-10

## 2021-08-10 PROCEDURE — ? BOTOX HYPERHIDROSIS THERAPY

## 2021-08-10 PROCEDURE — 99212 OFFICE O/P EST SF 10 MIN: CPT | Mod: 25

## 2021-08-10 PROCEDURE — ? COUNSELING

## 2021-08-10 PROCEDURE — ? PRESCRIPTION

## 2021-08-10 PROCEDURE — 64650 CHEMODENERV ECCRINE GLANDS: CPT

## 2021-08-10 RX ORDER — GLYCOPYRRONIUM 2.4 G/100G
CLOTH TOPICAL QHS
Qty: 30 | Refills: 6 | Status: ERX | COMMUNITY
Start: 2021-08-10

## 2021-08-10 RX ORDER — AMOXICILLIN 500 MG/1
TABLET, FILM COATED ORAL
Qty: 20 | Refills: 0 | Status: ERX | COMMUNITY
Start: 2021-08-10

## 2021-08-10 RX ORDER — OXYBUTYNIN CHLORIDE 5 MG/1
TABLET ORAL
Qty: 60 | Refills: 1 | Status: ERX | COMMUNITY
Start: 2021-08-10

## 2021-08-10 RX ADMIN — GLYCOPYRRONIUM: 2.4 CLOTH TOPICAL at 00:00

## 2021-08-10 RX ADMIN — OXYBUTYNIN CHLORIDE: 5 TABLET ORAL at 00:00

## 2021-08-10 RX ADMIN — AMOXICILLIN: 500 TABLET, FILM COATED ORAL at 00:00

## 2021-08-10 ASSESSMENT — LOCATION DETAILED DESCRIPTION DERM
LOCATION DETAILED: RIGHT AXILLARY VAULT
LOCATION DETAILED: LEFT AXILLARY VAULT

## 2021-08-10 ASSESSMENT — LOCATION SIMPLE DESCRIPTION DERM
LOCATION SIMPLE: LEFT AXILLARY VAULT
LOCATION SIMPLE: RIGHT AXILLARY VAULT

## 2021-08-10 ASSESSMENT — LOCATION ZONE DERM: LOCATION ZONE: AXILLAE

## 2021-08-10 NOTE — HPI: SWEATING (HYPERHIDROSIS)
How Severe Is It?: moderate
Sweating Severity Scale: 4- The sweating is intolerable and always interferes with daily activities
Is This A New Presentation, Or A Follow-Up?: Follow Up Hyperhidrosis
Additional History: The patient states that his sweating is controlled with the Botox for axillae, however, his hands and feet have begun to sweat profusely and has become intolerable for him.

## 2021-08-10 NOTE — PROCEDURE: BOTOX HYPERHIDROSIS THERAPY
Medical Necessity Clause: Botox hyperhidrosis therapy is medically necessary because no response to anything in the past.  Her sweating is causing mental distress and emotional issues because it is so severe

## 2021-09-21 ENCOUNTER — RX ONLY (OUTPATIENT)
Age: 26
Setting detail: RX ONLY
End: 2021-09-21

## 2021-09-21 RX ORDER — ONABOTULINUMTOXINA 100 [USP'U]/1
INJECTION, POWDER, LYOPHILIZED, FOR SOLUTION INTRADERMAL; INTRAMUSCULAR
Qty: 1 | Refills: 4 | Status: ERX

## 2021-10-30 ENCOUNTER — HOSPITAL ENCOUNTER (EMERGENCY)
Facility: HOSPITAL | Age: 26
Discharge: HOME | End: 2021-10-30
Attending: EMERGENCY MEDICINE
Payer: COMMERCIAL

## 2021-10-30 VITALS
HEART RATE: 87 BPM | HEIGHT: 70 IN | RESPIRATION RATE: 17 BRPM | BODY MASS INDEX: 21.47 KG/M2 | SYSTOLIC BLOOD PRESSURE: 138 MMHG | TEMPERATURE: 98.1 F | OXYGEN SATURATION: 97 % | DIASTOLIC BLOOD PRESSURE: 69 MMHG | WEIGHT: 150 LBS

## 2021-10-30 DIAGNOSIS — R30.0 DYSURIA: Primary | ICD-10-CM

## 2021-10-30 LAB
BACTERIA #/AREA URNS HPF: ABNORMAL /HPF
BILIRUB UR QL STRIP.AUTO: NEGATIVE MG/DL
CLARITY UR REFRACT.AUTO: ABNORMAL
COLOR UR AUTO: ABNORMAL
GLUCOSE UR STRIP.AUTO-MCNC: NEGATIVE MG/DL
HGB UR QL STRIP.AUTO: NEGATIVE
HYALINE CASTS #/AREA URNS LPF: ABNORMAL /LPF
KETONES UR STRIP.AUTO-MCNC: 2 MG/DL
LEUKOCYTE ESTERASE UR QL STRIP.AUTO: 1
MUCOUS THREADS URNS QL MICRO: 2 /LPF
NITRITE UR QL STRIP.AUTO: NEGATIVE
PH UR STRIP.AUTO: 7 [PH]
PROT UR QL STRIP.AUTO: 1
RBC #/AREA URNS HPF: ABNORMAL /HPF
SP GR UR REFRACT.AUTO: 1.03
SQUAMOUS URNS QL MICRO: 1 /HPF
UROBILINOGEN UR STRIP-ACNC: 1 EU/DL
WBC #/AREA URNS HPF: ABNORMAL /HPF

## 2021-10-30 PROCEDURE — 3E0234Z INTRODUCTION OF SERUM, TOXOID AND VACCINE INTO MUSCLE, PERCUTANEOUS APPROACH: ICD-10-PCS | Performed by: EMERGENCY MEDICINE

## 2021-10-30 PROCEDURE — 25000000 HC PHARMACY GENERAL: Performed by: PHYSICIAN ASSISTANT

## 2021-10-30 PROCEDURE — 87591 N.GONORRHOEAE DNA AMP PROB: CPT | Performed by: EMERGENCY MEDICINE

## 2021-10-30 PROCEDURE — 81003 URINALYSIS AUTO W/O SCOPE: CPT | Performed by: PHYSICIAN ASSISTANT

## 2021-10-30 PROCEDURE — 63700000 HC SELF-ADMINISTRABLE DRUG: Performed by: PHYSICIAN ASSISTANT

## 2021-10-30 PROCEDURE — 99284 EMERGENCY DEPT VISIT MOD MDM: CPT | Mod: 25

## 2021-10-30 PROCEDURE — 96374 THER/PROPH/DIAG INJ IV PUSH: CPT

## 2021-10-30 PROCEDURE — 87086 URINE CULTURE/COLONY COUNT: CPT | Performed by: PHYSICIAN ASSISTANT

## 2021-10-30 PROCEDURE — 81001 URINALYSIS AUTO W/SCOPE: CPT | Performed by: PHYSICIAN ASSISTANT

## 2021-10-30 PROCEDURE — 63600000 HC DRUGS/DETAIL CODE: Performed by: PHYSICIAN ASSISTANT

## 2021-10-30 RX ORDER — DOXYCYCLINE HYCLATE 100 MG
100 TABLET ORAL ONCE
Status: COMPLETED | OUTPATIENT
Start: 2021-10-30 | End: 2021-10-30

## 2021-10-30 RX ORDER — DOXYCYCLINE 100 MG/1
100 CAPSULE ORAL 2 TIMES DAILY
Qty: 14 CAPSULE | Refills: 0 | Status: SHIPPED | OUTPATIENT
Start: 2021-10-30 | End: 2021-11-06

## 2021-10-30 RX ADMIN — LIDOCAINE HYDROCHLORIDE 500 MG: 10 INJECTION, SOLUTION EPIDURAL; INFILTRATION; INTRACAUDAL; PERINEURAL at 02:07

## 2021-10-30 RX ADMIN — DOXYCYCLINE HYCLATE 100 MG: 100 TABLET, FILM COATED ORAL at 02:02

## 2021-10-30 ASSESSMENT — ENCOUNTER SYMPTOMS
DYSURIA: 1
NAUSEA: 0
CHILLS: 0
HEMATURIA: 0
MYALGIAS: 0
DIFFICULTY URINATING: 0
FREQUENCY: 0
VOMITING: 0
FEVER: 0
FLANK PAIN: 0

## 2021-10-30 NOTE — DISCHARGE INSTRUCTIONS
FOLLOW UP WITH YOUR PRIMARY CARE DOCTOR AS SPECIFIED ABOVE.      REVIEW ALL LABS AND FINAL IMAGING RESULTS WITH YOUR PRIMARY CARE DOCTOR AT YOUR NEXT APPOINTMENT.      RETURN TO THE EMERGENCY DEPARTMENT IMMEDIATELY WITH ANY WORSENING SYMPTOMS OR NEW SYMPTOMS.     Take doxycycline, 100mg, morning and night, for the next 7 days.     You were seen in the ED today for concern for a sexually transmitted disease. You were given medicines to treat sexually transmitted infections that you may have. It is important to talk to your sexual partners about your possible STI, and to encourage them to be treated as well. If you engage in sexual activity with the same partner from whom you may have contracted the STI, you are at risk for a repeat STI until they are treated as well. Using barrier contraception such as condoms, and limiting your sexual activity to one partner, can help reduce your chances of a sexually transmitted infection. Return to the ED or to your primary care provider if you have worsening symptoms, including if your symptoms do not improve, if you get fever, chills, rash, shortness of breath, cough, pain with urination or with bowel movements, or joint pain.      You have been tested for gonorrhea and chlamydia today. You require further testing. Please get testing for HIV and syphilis as soon as possible. Please abstain from sexual activity with any partner until you are tested and treated.

## 2021-10-30 NOTE — ED ATTESTATION NOTE
Seferino Henning was evaluated and managed by the physician assistant.  We discussed the plan of care, and I reviewed the relevant studies.     Jae Agudelo MD  10/30/21 2505

## 2021-10-30 NOTE — ED PROVIDER NOTES
Emergency Medicine Note  HPI   HISTORY OF PRESENT ILLNESS     24yo male with a prior history of chlamydia presenting with dysuria.  States this started 3 days ago.  Symptoms started 2 days after he had unprotected sex.  Denies penile discharge, groin pain, rash around the groin, sore throat, eye pain, urinary frequency, urgency, pain with bowel movements, fever, chills, nausea, vomiting, myalgias, hematuria, penile discharge.  No back pain or abdominal pain..      Exposure to STD  Associated symptoms: no fever, no myalgias, no nausea and no vomiting          Patient History   PAST HISTORY     Reviewed from Nursing Triage:      Past Medical History:   Diagnosis Date   • Abscess    • Axillary hyperhidrosis        No past surgical history on file.    No family history on file.    Social History     Tobacco Use   • Smoking status: Never Smoker   • Smokeless tobacco: Never Used   Substance Use Topics   • Alcohol use: Not Currently     Comment: socially   • Drug use: Yes     Types: Marijuana         Review of Systems   REVIEW OF SYSTEMS     Review of Systems   Constitutional: Negative for chills and fever.   Gastrointestinal: Negative for nausea and vomiting.   Genitourinary: Positive for dysuria. Negative for difficulty urinating, flank pain, frequency, hematuria, penile discharge, penile pain, penile swelling, scrotal swelling, testicular pain and urgency.   Musculoskeletal: Negative for myalgias.         VITALS     ED Vitals    Date/Time Temp Pulse Resp BP SpO2 [facility]   10/30/21 0045 36.7 °C (98.1 °F) 100 18 132/82 96 % DAH                       Physical Exam   PHYSICAL EXAM     Physical Exam  Vitals and nursing note reviewed.   Constitutional:       Appearance: Normal appearance. He is well-developed.   HENT:      Head: Normocephalic and atraumatic.   Eyes:      Comments: Bulbar conjunctiva normal   Cardiovascular:      Rate and Rhythm: Normal rate.      Heart sounds: Normal heart sounds.   Pulmonary:      Effort:  Pulmonary effort is normal. No respiratory distress.      Breath sounds: Normal breath sounds. No wheezing or rales.   Abdominal:      General: There is no distension.      Palpations: Abdomen is soft.      Tenderness: There is no abdominal tenderness.   Genitourinary:     Comments: Normal  exam other than mild inguinal lymphadenopathy.  No penile discharge on exam.  No inguinal rashes.  No scrotal swelling, testicular pain, or skin changes.  Musculoskeletal:         General: Normal range of motion.      Cervical back: Normal range of motion.   Skin:     General: Skin is warm and dry.   Neurological:      Mental Status: He is alert.      Comments: Normal speech.  Cranial nerves grossly intact.   Psychiatric:         Behavior: Behavior normal.           PROCEDURES     Procedures     DATA     Results     None          Imaging Results    None         No orders to display       Scoring tools                                 ED Course & MDM   MDM / ED COURSE and CLINICAL IMPRESSIONS     MDM  Number of Diagnoses or Management Options     Amount and/or Complexity of Data Reviewed  Clinical lab tests: ordered        ED Course as of 10/30/21 0244   Sat Oct 30, 2021   0200 25-year-old male, prior history of chlamydia, presenting with dysuria x3 days.  Started shortly after he had unprotected sex several days earlier.  He has had 4 sexual partners in the last 6 months.  Will test for gonorrhea, chlamydia, and empirically treat.  Physical exam unremarkable, other than mild inguinal lymphadenopathy.  He has no other complaints. [WP]      ED Course User Index  [WP] Jed Mendez PA C         Clinical Impressions as of 10/30/21 0244   Dysuria            Jed Mendez PA C  10/30/21 0244

## 2021-10-31 LAB — BACTERIA UR CULT: NORMAL

## 2021-11-01 LAB
C TRACH RRNA SPEC QL NAA+PROBE: NEGATIVE
N GONORRHOEA RRNA SPEC QL NAA+PROBE: POSITIVE

## 2022-05-26 ENCOUNTER — HOSPITAL ENCOUNTER (EMERGENCY)
Facility: HOSPITAL | Age: 27
Discharge: HOME | End: 2022-05-26
Attending: EMERGENCY MEDICINE
Payer: COMMERCIAL

## 2022-05-26 VITALS
HEIGHT: 69 IN | HEART RATE: 53 BPM | SYSTOLIC BLOOD PRESSURE: 130 MMHG | TEMPERATURE: 98.6 F | RESPIRATION RATE: 18 BRPM | DIASTOLIC BLOOD PRESSURE: 80 MMHG | BODY MASS INDEX: 22.22 KG/M2 | OXYGEN SATURATION: 99 % | WEIGHT: 150 LBS

## 2022-05-26 DIAGNOSIS — Z20.2 EXPOSURE TO STD: Primary | ICD-10-CM

## 2022-05-26 LAB
BACTERIA URNS QL MICRO: ABNORMAL /HPF
BILIRUB UR QL STRIP.AUTO: NEGATIVE MG/DL
CLARITY UR REFRACT.AUTO: CLEAR
COLOR UR AUTO: YELLOW
GLUCOSE UR STRIP.AUTO-MCNC: NEGATIVE MG/DL
HGB UR QL STRIP.AUTO: NEGATIVE
HYALINE CASTS #/AREA URNS LPF: ABNORMAL /LPF
KETONES UR STRIP.AUTO-MCNC: NEGATIVE MG/DL
LEUKOCYTE ESTERASE UR QL STRIP.AUTO: NEGATIVE
MUCOUS THREADS URNS QL MICRO: ABNORMAL /LPF
NITRITE UR QL STRIP.AUTO: NEGATIVE
PH UR STRIP.AUTO: 6 [PH]
PROT UR QL STRIP.AUTO: ABNORMAL
RBC #/AREA URNS HPF: ABNORMAL /HPF
SP GR UR REFRACT.AUTO: 1.03
SQUAMOUS URNS QL MICRO: ABNORMAL /HPF
UROBILINOGEN UR STRIP-ACNC: 0.2 EU/DL
WBC #/AREA URNS HPF: ABNORMAL /HPF

## 2022-05-26 PROCEDURE — 96372 THER/PROPH/DIAG INJ SC/IM: CPT

## 2022-05-26 PROCEDURE — 81003 URINALYSIS AUTO W/O SCOPE: CPT | Performed by: PHYSICIAN ASSISTANT

## 2022-05-26 PROCEDURE — 25000000 HC PHARMACY GENERAL: Performed by: PHYSICIAN ASSISTANT

## 2022-05-26 PROCEDURE — 63600000 HC DRUGS/DETAIL CODE: Performed by: PHYSICIAN ASSISTANT

## 2022-05-26 PROCEDURE — 99283 EMERGENCY DEPT VISIT LOW MDM: CPT | Mod: 25

## 2022-05-26 PROCEDURE — 63700000 HC SELF-ADMINISTRABLE DRUG: Performed by: PHYSICIAN ASSISTANT

## 2022-05-26 PROCEDURE — 81001 URINALYSIS AUTO W/SCOPE: CPT | Performed by: PHYSICIAN ASSISTANT

## 2022-05-26 PROCEDURE — 87491 CHLMYD TRACH DNA AMP PROBE: CPT | Performed by: PHYSICIAN ASSISTANT

## 2022-05-26 PROCEDURE — 3E02329 INTRODUCTION OF OTHER ANTI-INFECTIVE INTO MUSCLE, PERCUTANEOUS APPROACH: ICD-10-PCS | Performed by: EMERGENCY MEDICINE

## 2022-05-26 RX ORDER — DOXYCYCLINE 100 MG/1
100 CAPSULE ORAL 2 TIMES DAILY
Qty: 14 CAPSULE | Refills: 0 | Status: SHIPPED | OUTPATIENT
Start: 2022-05-26 | End: 2022-06-02

## 2022-05-26 RX ORDER — DOXYCYCLINE 100 MG/1
100 CAPSULE ORAL 2 TIMES DAILY
Qty: 42 CAPSULE | Refills: 0 | Status: SHIPPED | OUTPATIENT
Start: 2022-05-26 | End: 2022-05-26 | Stop reason: SDUPTHER

## 2022-05-26 RX ORDER — DOXYCYCLINE HYCLATE 100 MG
100 TABLET ORAL ONCE
Status: COMPLETED | OUTPATIENT
Start: 2022-05-26 | End: 2022-05-26

## 2022-05-26 RX ADMIN — LIDOCAINE HYDROCHLORIDE 500 MG: 10 INJECTION, SOLUTION EPIDURAL; INFILTRATION; INTRACAUDAL; PERINEURAL at 10:51

## 2022-05-26 RX ADMIN — DOXYCYCLINE HYCLATE 100 MG: 100 TABLET, FILM COATED ORAL at 10:52

## 2022-05-26 ASSESSMENT — ENCOUNTER SYMPTOMS
COLOR CHANGE: 0
NECK PAIN: 0
FACIAL SWELLING: 0
SORE THROAT: 0
WEAKNESS: 0
WHEEZING: 0
DIARRHEA: 0
DIAPHORESIS: 0
HEADACHES: 0
SHORTNESS OF BREATH: 0
DIFFICULTY URINATING: 0
VOMITING: 0
BACK PAIN: 0
HEMATURIA: 0
COUGH: 0
FEVER: 0
NAUSEA: 0
DYSURIA: 1
SPEECH DIFFICULTY: 0
ABDOMINAL PAIN: 0
ACTIVITY CHANGE: 0
SEIZURES: 0
AGITATION: 0

## 2022-05-26 NOTE — DISCHARGE INSTRUCTIONS
Currently her presentation which is consistent with STD.  Please follow-up primary care for persistent symptoms despite ED treatment.  Please take antibiotics as prescribed and for complete duration.  Please alert any sexual partners.

## 2022-05-26 NOTE — ED PROVIDER NOTES
Emergency Medicine Note  HPI   HISTORY OF PRESENT ILLNESS     26-year-old male presents emergency department with dysuria and testicular discomfort.  Patient mentions that his sexual partner tested positive for chlamydia.  Patient does not use barrier contraceptive when engaging in sexual intercourse.  Patient denies any nausea, vomiting or fever.      Exposure to STD  Associated symptoms: no abdominal pain, no chest pain, no cough, no diarrhea, no fever, no headaches, no nausea, no shortness of breath, no sore throat, no vomiting and no wheezing          Patient History   PAST HISTORY     Reviewed from Nursing Triage:         Past Medical History:   Diagnosis Date   • Abscess    • Axillary hyperhidrosis        History reviewed. No pertinent surgical history.    History reviewed. No pertinent family history.    Social History     Tobacco Use   • Smoking status: Never Smoker   • Smokeless tobacco: Never Used   Substance Use Topics   • Alcohol use: Yes     Comment: socially   • Drug use: Yes     Types: Marijuana         Review of Systems   REVIEW OF SYSTEMS     Review of Systems   Constitutional: Negative for activity change, diaphoresis and fever.   HENT: Negative for facial swelling and sore throat.    Eyes: Negative for visual disturbance.   Respiratory: Negative for cough, shortness of breath and wheezing.    Cardiovascular: Negative for chest pain and leg swelling.   Gastrointestinal: Negative for abdominal pain, diarrhea, nausea and vomiting.   Genitourinary: Positive for dysuria and testicular pain. Negative for difficulty urinating and hematuria.   Musculoskeletal: Negative for back pain and neck pain.   Skin: Negative for color change and pallor.   Neurological: Negative for seizures, syncope, speech difficulty, weakness and headaches.   Psychiatric/Behavioral: Negative for agitation and behavioral problems.   All other systems reviewed and are negative.        VITALS     ED Vitals    Date/Time Temp Pulse  Resp BP SpO2 [facility]   05/26/22 0817 37 °C (98.6 °F) 65 -- 131/76 100 % TE                       Physical Exam   PHYSICAL EXAM     Physical Exam  Vitals and nursing note reviewed.   Constitutional:       Appearance: He is well-developed.   HENT:      Head: Normocephalic and atraumatic.   Eyes:      Conjunctiva/sclera: Conjunctivae normal.   Cardiovascular:      Rate and Rhythm: Normal rate and regular rhythm.   Pulmonary:      Effort: Pulmonary effort is normal.      Breath sounds: Normal breath sounds.   Abdominal:      General: There is no distension.      Palpations: Abdomen is soft. There is no mass.      Tenderness: There is no abdominal tenderness.   Musculoskeletal:         General: No tenderness or deformity. Normal range of motion.      Cervical back: Normal range of motion.   Skin:     General: Skin is warm and dry.   Neurological:      General: No focal deficit present.      Mental Status: He is alert. Mental status is at baseline.   Psychiatric:         Behavior: Behavior normal.           PROCEDURES     Procedures     DATA     Results     None          Imaging Results    None         No orders to display       Scoring tools                                 ED Course & MDM   MDM / ED COURSE / CLINICAL IMPRESSIONS / DISPO     MDM    ED Course as of 05/28/22 1525   Sat May 28, 2022   1524 Spoke with patient, name and date of birth confirmed.  Discussed the positive chlamydia results, patient states that he understands to complete the full course of doxycycline and notify all partners to be tested and treated, understands to practice safe sex. [STEPHANIE]      ED Course User Index  [STEPHANIE] Je Rojas PA C         Clinical Impressions as of 05/28/22 1525   Exposure to STD              Je Espinoza PA C  06/01/22 1641

## 2022-05-27 LAB
C TRACH RRNA SPEC QL NAA+PROBE: POSITIVE
N GONORRHOEA RRNA SPEC QL NAA+PROBE: NEGATIVE

## 2022-06-03 NOTE — ED ATTESTATION NOTE
The patient was evaluated and managed by the physician assistant under my supervision.   I agree with the PA's assessment and plan.       Bishnu Serra MD  06/02/22 9180

## 2024-02-04 ENCOUNTER — HOSPITAL ENCOUNTER (EMERGENCY)
Facility: HOSPITAL | Age: 29
Discharge: HOME | End: 2024-02-04
Attending: EMERGENCY MEDICINE
Payer: COMMERCIAL

## 2024-02-04 ENCOUNTER — APPOINTMENT (EMERGENCY)
Dept: RADIOLOGY | Facility: HOSPITAL | Age: 29
End: 2024-02-04
Payer: COMMERCIAL

## 2024-02-04 VITALS
HEIGHT: 69 IN | OXYGEN SATURATION: 99 % | RESPIRATION RATE: 18 BRPM | HEART RATE: 59 BPM | TEMPERATURE: 98.5 F | SYSTOLIC BLOOD PRESSURE: 113 MMHG | DIASTOLIC BLOOD PRESSURE: 60 MMHG | WEIGHT: 150 LBS | BODY MASS INDEX: 22.22 KG/M2

## 2024-02-04 DIAGNOSIS — J40 BRONCHITIS: Primary | ICD-10-CM

## 2024-02-04 DIAGNOSIS — E86.0 DEHYDRATION: ICD-10-CM

## 2024-02-04 DIAGNOSIS — R11.2 NAUSEA AND VOMITING, UNSPECIFIED VOMITING TYPE: ICD-10-CM

## 2024-02-04 LAB
ANION GAP SERPL CALC-SCNC: 7 MEQ/L (ref 3–15)
BASOPHILS # BLD: 0 K/UL (ref 0.01–0.1)
BASOPHILS NFR BLD: 0 %
BUN SERPL-MCNC: 8 MG/DL (ref 7–25)
CALCIUM SERPL-MCNC: 9.1 MG/DL (ref 8.6–10.3)
CHLORIDE SERPL-SCNC: 102 MEQ/L (ref 98–107)
CO2 SERPL-SCNC: 29 MEQ/L (ref 21–31)
CREAT SERPL-MCNC: 1.1 MG/DL (ref 0.7–1.3)
DIFFERENTIAL METHOD BLD: ABNORMAL
EGFRCR SERPLBLD CKD-EPI 2021: >60 ML/MIN/1.73M*2
EOSINOPHIL # BLD: 0 K/UL (ref 0.04–0.54)
EOSINOPHIL NFR BLD: 0 %
ERYTHROCYTE [DISTWIDTH] IN BLOOD BY AUTOMATED COUNT: 11.4 % (ref 11.6–14.4)
FLUAV RNA SPEC QL NAA+PROBE: NEGATIVE
FLUBV RNA SPEC QL NAA+PROBE: NEGATIVE
GLUCOSE SERPL-MCNC: 79 MG/DL (ref 70–99)
HCT VFR BLD AUTO: 45.6 % (ref 40.1–51)
HGB BLD-MCNC: 16.2 G/DL (ref 13.7–17.5)
LYMPHOCYTES # BLD: 1.35 K/UL (ref 1.2–3.5)
LYMPHOCYTES NFR BLD: 43 %
MCH RBC QN AUTO: 31.4 PG (ref 28–33.2)
MCHC RBC AUTO-ENTMCNC: 35.5 G/DL (ref 32.2–36.5)
MCV RBC AUTO: 88.4 FL (ref 83–98)
MONOCYTES # BLD: 0.66 K/UL (ref 0.3–1)
MONOCYTES NFR BLD: 21 %
NEUTS BAND # BLD: 0.03 K/UL (ref 0–0.53)
NEUTS BAND # BLD: 0.98 K/UL (ref 1.7–7)
NEUTS BAND NFR BLD: 1 %
NEUTS SEG NFR BLD: 31 %
PDW BLD AUTO: 11.6 FL (ref 9.4–12.4)
PLAT MORPH BLD: NORMAL
PLATELET # BLD AUTO: 204 K/UL (ref 150–350)
PLATELET # BLD EST: ABNORMAL 10*3/UL
POTASSIUM SERPL-SCNC: 3.8 MEQ/L (ref 3.5–5.1)
RBC # BLD AUTO: 5.16 M/UL (ref 4.5–5.8)
RBC MORPH BLD: NORMAL
RSV RNA SPEC QL NAA+PROBE: NEGATIVE
SARS-COV-2 RNA RESP QL NAA+PROBE: NEGATIVE
SODIUM SERPL-SCNC: 138 MEQ/L (ref 136–145)
VARIANT LYMPHS # BLD MANUAL: 0.13 K/UL
VARIANT LYMPHS NFR BLD: 4 %
WBC # BLD AUTO: 3.15 K/UL (ref 3.8–10.5)

## 2024-02-04 PROCEDURE — 87637 SARSCOV2&INF A&B&RSV AMP PRB: CPT | Performed by: EMERGENCY MEDICINE

## 2024-02-04 PROCEDURE — 63700000 HC SELF-ADMINISTRABLE DRUG: Performed by: EMERGENCY MEDICINE

## 2024-02-04 PROCEDURE — 80048 BASIC METABOLIC PNL TOTAL CA: CPT | Performed by: EMERGENCY MEDICINE

## 2024-02-04 PROCEDURE — 3E0337Z INTRODUCTION OF ELECTROLYTIC AND WATER BALANCE SUBSTANCE INTO PERIPHERAL VEIN, PERCUTANEOUS APPROACH: ICD-10-PCS | Performed by: EMERGENCY MEDICINE

## 2024-02-04 PROCEDURE — 99284 EMERGENCY DEPT VISIT MOD MDM: CPT | Mod: 25,U5

## 2024-02-04 PROCEDURE — 71046 X-RAY EXAM CHEST 2 VIEWS: CPT

## 2024-02-04 PROCEDURE — 85025 COMPLETE CBC W/AUTO DIFF WBC: CPT | Performed by: EMERGENCY MEDICINE

## 2024-02-04 PROCEDURE — 36415 COLL VENOUS BLD VENIPUNCTURE: CPT | Performed by: EMERGENCY MEDICINE

## 2024-02-04 PROCEDURE — 25800000 HC PHARMACY IV SOLUTIONS: Performed by: EMERGENCY MEDICINE

## 2024-02-04 PROCEDURE — 96360 HYDRATION IV INFUSION INIT: CPT

## 2024-02-04 RX ORDER — AZITHROMYCIN 250 MG/1
TABLET, FILM COATED ORAL
Qty: 4 TABLET | Refills: 0 | Status: SHIPPED | OUTPATIENT
Start: 2024-02-04

## 2024-02-04 RX ORDER — AZITHROMYCIN 250 MG/1
500 TABLET, FILM COATED ORAL ONCE
Status: COMPLETED | OUTPATIENT
Start: 2024-02-04 | End: 2024-02-04

## 2024-02-04 RX ORDER — AZITHROMYCIN 250 MG/1
TABLET, FILM COATED ORAL
Qty: 4 TABLET | Refills: 0 | Status: SHIPPED | OUTPATIENT
Start: 2024-02-04 | End: 2024-02-04

## 2024-02-04 RX ADMIN — SODIUM CHLORIDE 1000 ML: 9 INJECTION, SOLUTION INTRAVENOUS at 17:04

## 2024-02-04 RX ADMIN — AZITHROMYCIN 500 MG: 250 TABLET, FILM COATED ORAL at 18:44

## 2024-02-04 NOTE — DISCHARGE INSTRUCTIONS
RETURN IMMEDIATELY FOR ANY NEW OR WORSE SYMPTOMS;    REVIEW ANY LABS AND FINAL IMAGING RESULTS (SOMETIMES THESE MAY NOT BE BACK UNTIL TOMORROW)  WITH YOUR DOCTORS AT YOUR NEXT APPOINTMENT.

## 2024-02-04 NOTE — LETTER
February 4, 2024    Patient: Seferino Henning   YOB: 1995   Date of Visit: 2/4/2024       To Whom It May Concern:    Seferino Henning was seen and treated in our emergency department on 2/4/2024. He can return to work 2/6/2024    If you have any questions or concerns, please don't hesitate to call.              Yoseph Farfan RN

## 2024-02-05 NOTE — ED PROVIDER NOTES
Emergency Medicine Note  HPI   HISTORY OF PRESENT ILLNESS     Dictation errors and typographical errors may be present in the document below.  [place]  RN hx/EHR reviewed  Hx obtained from pt      2 weeks of nausea vomiting , diarrhea ,fatigue and a cough. Coughs sounds productive, but pt reports nothing is coming up. +smoker. No recent travel. No fevers. Daughter w/similar sx and was recently dx'd w/ pna.    No cp/abd pain;no hematemesis/brbpr/melena.            Patient History   PAST HISTORY     Reviewed from Nursing Triage:       Past Medical History:   Diagnosis Date   • Abscess    • Axillary hyperhidrosis        No past surgical history on file.    No family history on file.    Social History     Tobacco Use   • Smoking status: Never   • Smokeless tobacco: Never   Substance Use Topics   • Alcohol use: Yes     Comment: socially   • Drug use: Yes     Types: Marijuana         Review of Systems   REVIEW OF SYSTEMS     Review of Systems      VITALS     ED Vitals    Date/Time Temp Pulse Resp BP SpO2 [facility]   02/04/24 1847 -- 59 18 113/60 99 %    02/04/24 1619 36.9 °C (98.5 °F) 69 18 112/61 100 %    02/04/24 1450 37.2 °C (99 °F) 58 18 120/64 99 % SK        Pulse Ox %: 99 % (02/04/24 1649)  Pulse Ox Interpretation: Normal (02/04/24 1649)  Heart Rate: 70 (02/04/24 1649)  Rhythm Strip Interpretation: Normal Sinus Rhythm (02/04/24 1649)     Physical Exam   PHYSICAL EXAM     Physical Exam  Vitals and nursing note reviewed.   Constitutional:       Appearance: He is well-developed.   HENT:      Head: Normocephalic and atraumatic.      Right Ear: External ear normal.      Left Ear: External ear normal.      Mouth/Throat:      Mouth: Mucous membranes are dry.   Eyes:      General: No scleral icterus.     Conjunctiva/sclera: Conjunctivae normal.   Neck:      Trachea: No tracheal deviation.   Cardiovascular:      Rate and Rhythm: Normal rate and regular rhythm.      Heart sounds: Normal heart sounds. No murmur heard.      Comments: 2+ radial b/l  Pulmonary:      Effort: Pulmonary effort is normal. No respiratory distress.      Breath sounds: Normal breath sounds.   Abdominal:      General: There is no distension.      Palpations: Abdomen is soft. There is no mass.      Tenderness: There is no abdominal tenderness. There is no guarding or rebound.   Musculoskeletal:         General: No tenderness. Normal range of motion.      Cervical back: Neck supple.      Comments: No calf assymetry;  No LE cords/tenderness   Skin:     General: Skin is warm and dry.   Neurological:      Mental Status: He is alert.      Comments: Communicates clearly;  Moves all extremities           PROCEDURES     Procedures     DATA     Results     Procedure Component Value Units Date/Time    CBC and differential [[id number]]  (Abnormal) Collected: 02/04/24 1702    Specimen: Blood, Venous Updated: 02/04/24 2201     WBC 3.15 K/uL      RBC 5.16 M/uL      Hemoglobin 16.2 g/dL      Hematocrit 45.6 %      MCV 88.4 fL      MCH 31.4 pg      MCHC 35.5 g/dL      RDW 11.4 %      Platelets 204 K/uL      MPV 11.6 fL      Differential Type Manu     Neutrophils 31 %      Lymphocytes 43 %      Monocytes 21 %      Eosinophils 0 %      Basophils 0 %      Bands 1 %      Atypical Lymphocytes 4 %      Neutrophils, Absolute 0.98 K/uL      Lymphocytes, Absolute 1.35 K/uL      Monocytes, Absolute 0.66 K/uL      Eosinophils, Absolute 0.00 K/uL      Basophils, Absolute 0.00 K/uL      Bands, Absolute 0.03 K/uL      Atypical Lymphs, Absolute 0.13 K/uL      RBC Morphology Normal     PLT Morphology Normal     Platelet Estimate Adequate (150,000-400,000)    Basic metabolic panel [[id number]]  (Normal) Collected: 02/04/24 1702    Specimen: Blood, Venous Updated: 02/04/24 1750     Sodium 138 mEQ/L      Potassium 3.8 mEQ/L      Chloride 102 mEQ/L      CO2 29 mEQ/L      BUN 8 mg/dL      Creatinine 1.1 mg/dL      Glucose 79 mg/dL      Calcium 9.1 mg/dL      eGFR >60.0 mL/min/1.73m*2      Comment:  Calculation based on the Chronic Kidney Disease Epidemiology Collaboration (CKD-EPI) equation refit without adjustment for race.        Anion Gap 7 mEQ/L     SARS-CoV-2 (COVID-19) Nasopharynx [[id number]]  (Normal) Collected: 02/04/24 1452    Specimen: Nasopharyngeal Swab from Nasopharynx Updated: 02/04/24 1634    Narrative:      The following orders were created for panel order SARS-CoV-2 (COVID-19) Nasopharynx.  Procedure                               Abnormality         Status                     ---------                               -----------         ------                     SARS-COV-2 (COVID-19)/ F...[[id number]]  Normal              Final result                 Please view results for these tests on the individual orders.    SARS-COV-2 (COVID-19)/ FLU A/B, AND RSV, PCR Nasopharynx [[id number]]  (Normal) Collected: 02/04/24 1452    Specimen: Nasopharyngeal Swab from Nasopharynx Updated: 02/04/24 1634     SARS-CoV-2 (COVID-19) Negative     Influenza A Negative     Influenza B Negative     Respiratory Syncytial Virus Negative    Narrative:      Testing performed using real-time PCR for detection of COVID-19. EUA approved validation studies performed on site.           Imaging Results          X-RAY CHEST 2 VIEWS (Final result)  Result time 02/04/24 15:50:43    Final result                 Impression:    IMPRESSION:  No acute cardiopulmonary process.               Narrative:    CLINICAL HISTORY:      Shortness of breath    COMPARISON:  04/06/2020    COMMENT:  Two views of the chest were performed.    The cardiovascular silhouette is within normal limits. Costophrenic angles are  clear. No focal consolidation or pneumothorax.                                No orders to display       Scoring tools                                  ED Course & MDM   MDM / ED COURSE / CLINICAL IMPRESSION / DISPO     Medical Decision Making  On re-eval, shortly before dc, pt feels improved and looks well.    Etiology of SOB likely  due to acute bronchitis/viral syndrome  Given smoking hx and duration of sx, will rx zpac..    Doubt PE/ADHF/bacterial pna/acs/ptx/other dangerous cause of sx.    Pt dc'd for close out-pt management. Doubt acute emergency at this time. Conditions for which immediate rted discussed with pt, who demonstrated understanding.       Bronchitis: acute illness or injury with systemic symptoms  Dehydration: acute illness or injury with systemic symptoms  Nausea and vomiting, unspecified vomiting type: acute illness or injury  Amount and/or Complexity of Data Reviewed  Labs: ordered.  Radiology: ordered and independent interpretation performed.     Details: cxr- no infiltrate      Risk  Prescription drug management.          ED Course as of 02/05/24 1508   Sun Feb 04, 2024 1816 *PROCEDURE NOTE*  Using aseptic technique, I placed an 18g pIV in pt's R cephalic vein on 1st attempt (RN/PCT either unable to complete after multiple attempts OR pt's clinical situation made IV placement by me prior to when they were available to perform the IV medically ideal)   [JF]   1826 Smoker, will rx zpac [JF]      ED Course User Index  [JF] Fito Jain MD     Clinical Impression      Bronchitis   Nausea and vomiting, unspecified vomiting type   Dehydration     _________________     ED Disposition   Discharge                   Fito Jain MD  02/05/24 1511